# Patient Record
Sex: MALE | Race: WHITE | NOT HISPANIC OR LATINO | Employment: OTHER | URBAN - METROPOLITAN AREA
[De-identification: names, ages, dates, MRNs, and addresses within clinical notes are randomized per-mention and may not be internally consistent; named-entity substitution may affect disease eponyms.]

---

## 2023-04-10 DIAGNOSIS — I82.5Y9 CHRONIC DEEP VEIN THROMBOSIS (DVT) OF PROXIMAL VEIN OF LOWER EXTREMITY, UNSPECIFIED LATERALITY (MULTI): Primary | ICD-10-CM

## 2023-04-10 PROBLEM — I89.0 LYMPHEDEMA OF BOTH LOWER EXTREMITIES: Status: ACTIVE | Noted: 2023-04-10

## 2023-04-10 PROBLEM — M79.671 CHRONIC PAIN IN RIGHT FOOT: Status: ACTIVE | Noted: 2023-04-10

## 2023-04-10 PROBLEM — M77.31 HEEL SPUR, RIGHT: Status: ACTIVE | Noted: 2023-04-10

## 2023-04-10 PROBLEM — M62.830 SPASM OF MUSCLE, BACK: Status: ACTIVE | Noted: 2023-04-10

## 2023-04-10 PROBLEM — B35.1 ONYCHOMYCOSIS: Status: ACTIVE | Noted: 2023-04-10

## 2023-04-10 PROBLEM — G47.30 OBSERVED SLEEP APNEA: Status: ACTIVE | Noted: 2023-04-10

## 2023-04-10 PROBLEM — E66.01 OBESITY, MORBID, BMI 40.0-49.9 (MULTI): Status: ACTIVE | Noted: 2023-04-10

## 2023-04-10 PROBLEM — M25.561 RIGHT KNEE PAIN: Status: ACTIVE | Noted: 2023-04-10

## 2023-04-10 PROBLEM — R26.89 ANTALGIC GAIT: Status: ACTIVE | Noted: 2023-04-10

## 2023-04-10 PROBLEM — M20.5X1 MALLET TOE OF RIGHT FOOT: Status: ACTIVE | Noted: 2023-04-10

## 2023-04-10 PROBLEM — G25.81 RESTLESS LEG SYNDROME: Status: ACTIVE | Noted: 2023-04-10

## 2023-04-10 PROBLEM — L60.0 ONYCHOCRYPTOSIS: Status: ACTIVE | Noted: 2023-04-10

## 2023-04-10 PROBLEM — N40.1 BPH WITH OBSTRUCTION/LOWER URINARY TRACT SYMPTOMS: Status: ACTIVE | Noted: 2023-04-10

## 2023-04-10 PROBLEM — N13.8 BPH WITH OBSTRUCTION/LOWER URINARY TRACT SYMPTOMS: Status: ACTIVE | Noted: 2023-04-10

## 2023-04-10 PROBLEM — I89.0 LYMPHEDEMA: Status: ACTIVE | Noted: 2023-04-10

## 2023-04-10 PROBLEM — F41.9 ANXIETY: Status: ACTIVE | Noted: 2023-04-10

## 2023-04-10 PROBLEM — I87.2 VENOUS STASIS DERMATITIS OF LOWER EXTREMITY: Status: ACTIVE | Noted: 2023-04-10

## 2023-04-10 PROBLEM — G89.29 CHRONIC PAIN IN RIGHT FOOT: Status: ACTIVE | Noted: 2023-04-10

## 2023-04-10 PROBLEM — U07.1 DISEASE DUE TO SEVERE ACUTE RESPIRATORY SYNDROME CORONAVIRUS 2 (SARS-COV-2): Status: ACTIVE | Noted: 2023-04-10

## 2023-04-10 PROBLEM — I87.2 VENOUS INSUFFICIENCY: Status: ACTIVE | Noted: 2023-04-10

## 2023-04-10 PROBLEM — I15.9 BENIGN SECONDARY HYPERTENSION: Status: ACTIVE | Noted: 2023-04-10

## 2023-04-10 PROBLEM — I83.93 VARICOSE VEINS OF LEGS: Status: ACTIVE | Noted: 2023-04-10

## 2023-04-10 PROBLEM — G20.A1 PARKINSON'S DISEASE (MULTI): Status: ACTIVE | Noted: 2023-04-10

## 2023-04-10 PROBLEM — L60.3 DYSTROPHIC NAIL: Status: ACTIVE | Noted: 2023-04-10

## 2023-04-10 PROBLEM — I82.509 CHRONIC DEEP VEIN THROMBOSIS (DVT) (MULTI): Status: ACTIVE | Noted: 2023-04-10

## 2023-04-10 PROBLEM — G47.00 INSOMNIA: Status: ACTIVE | Noted: 2023-04-10

## 2023-04-10 PROBLEM — R06.83 SNORING: Status: ACTIVE | Noted: 2023-04-10

## 2023-04-10 PROBLEM — R47.9 SPEECH ABNORMALITY: Status: ACTIVE | Noted: 2023-04-10

## 2023-04-10 PROBLEM — G47.9 SLEEP DIFFICULTIES: Status: ACTIVE | Noted: 2023-04-10

## 2023-04-10 PROBLEM — R41.3 MEMORY LOSS: Status: ACTIVE | Noted: 2023-04-10

## 2023-04-10 PROBLEM — R60.9 EDEMA: Status: ACTIVE | Noted: 2023-04-10

## 2023-04-10 PROBLEM — R60.0 LOWER EXTREMITY EDEMA: Status: ACTIVE | Noted: 2023-04-10

## 2023-04-10 PROBLEM — R26.81 UNSTEADY GAIT: Status: ACTIVE | Noted: 2023-04-10

## 2023-04-10 PROBLEM — R97.20 ELEVATED PSA: Status: ACTIVE | Noted: 2023-04-10

## 2023-04-10 PROBLEM — I51.7 LVH (LEFT VENTRICULAR HYPERTROPHY): Status: ACTIVE | Noted: 2023-04-10

## 2023-04-10 PROBLEM — R06.02 SHORT OF BREATH ON EXERTION: Status: ACTIVE | Noted: 2023-04-10

## 2023-04-10 RX ORDER — ROPINIROLE 3 MG/1
TABLET, FILM COATED ORAL
COMMUNITY
Start: 2022-10-24 | End: 2023-07-06 | Stop reason: SDUPTHER

## 2023-04-10 RX ORDER — OMEGA-3S/DHA/EPA/FISH OIL 1000-1400
CAPSULE,DELAYED RELEASE (ENTERIC COATED) ORAL
COMMUNITY

## 2023-04-10 RX ORDER — TRIAMCINOLONE ACETONIDE 1 MG/G
CREAM TOPICAL
COMMUNITY
Start: 2021-12-21

## 2023-04-10 RX ORDER — CITALOPRAM 20 MG/1
1 TABLET, FILM COATED ORAL DAILY
COMMUNITY
Start: 2017-06-15

## 2023-04-10 RX ORDER — AMOXICILLIN 500 MG/1
CAPSULE ORAL
COMMUNITY
Start: 2022-11-18

## 2023-04-10 RX ORDER — AMLODIPINE BESYLATE 10 MG/1
TABLET ORAL
COMMUNITY
Start: 2022-10-24

## 2023-04-10 RX ORDER — LISINOPRIL 5 MG/1
1 TABLET ORAL DAILY
COMMUNITY
Start: 2023-03-24

## 2023-04-10 RX ORDER — AMLODIPINE BESYLATE 2.5 MG/1
2.5 TABLET ORAL DAILY
COMMUNITY

## 2023-04-10 RX ORDER — ACETAMINOPHEN 325 MG/1
TABLET ORAL
COMMUNITY

## 2023-04-10 RX ORDER — CHOLECALCIFEROL (VITAMIN D3) 25 MCG
TABLET ORAL
COMMUNITY

## 2023-04-10 RX ORDER — RIVAROXABAN 20 MG/1
1 TABLET, FILM COATED ORAL DAILY
COMMUNITY
Start: 2021-04-19 | End: 2023-04-10 | Stop reason: SDUPTHER

## 2023-04-10 RX ORDER — ALBUTEROL SULFATE 90 UG/1
AEROSOL, METERED RESPIRATORY (INHALATION)
COMMUNITY

## 2023-04-10 RX ORDER — GABAPENTIN 300 MG/1
CAPSULE ORAL
COMMUNITY
Start: 2021-05-04

## 2023-04-10 RX ORDER — LORAZEPAM 1 MG/1
TABLET ORAL EVERY 8 HOURS PRN
COMMUNITY
Start: 2021-05-05

## 2023-04-10 RX ORDER — LANOLIN ALCOHOL/MO/W.PET/CERES
CREAM (GRAM) TOPICAL
COMMUNITY
Start: 2021-04-19

## 2023-04-10 RX ORDER — AMLODIPINE BESYLATE 5 MG/1
1 TABLET ORAL DAILY
COMMUNITY
Start: 2022-10-24

## 2023-04-10 RX ORDER — ASCORBIC ACID 500 MG
TABLET ORAL
COMMUNITY

## 2023-04-10 RX ORDER — AMOXICILLIN 500 MG/1
1 TABLET, FILM COATED ORAL EVERY 6 HOURS
COMMUNITY
Start: 2022-11-16

## 2023-04-10 RX ORDER — CARBIDOPA AND LEVODOPA 25; 100 MG/1; MG/1
TABLET ORAL
COMMUNITY
Start: 2021-04-06

## 2023-04-10 RX ORDER — FUROSEMIDE 40 MG/1
40 TABLET ORAL DAILY
COMMUNITY

## 2023-04-10 RX ORDER — CARBIDOPA AND LEVODOPA 25; 100 MG/1; MG/1
TABLET, EXTENDED RELEASE ORAL
COMMUNITY
Start: 2022-06-13

## 2023-04-10 RX ORDER — FLUTICASONE PROPIONATE 50 MCG
SPRAY, SUSPENSION (ML) NASAL
COMMUNITY

## 2023-04-10 RX ORDER — BISACODYL 10 MG/1
SUPPOSITORY RECTAL
COMMUNITY

## 2023-04-10 RX ORDER — DIVALPROEX SODIUM 125 MG/1
TABLET, DELAYED RELEASE ORAL
COMMUNITY
Start: 2021-05-05

## 2023-04-10 RX ORDER — ADHESIVE BANDAGE
BANDAGE TOPICAL
COMMUNITY

## 2023-04-10 RX ORDER — ROPINIROLE HYDROCHLORIDE 6 MG/1
1 TABLET, FILM COATED, EXTENDED RELEASE ORAL DAILY
COMMUNITY
Start: 2022-04-11

## 2023-04-10 RX ORDER — DOCUSATE SODIUM 100 MG/1
1 CAPSULE, LIQUID FILLED ORAL 2 TIMES DAILY PRN
COMMUNITY
Start: 2021-04-19

## 2023-04-10 RX ORDER — FUROSEMIDE 20 MG/1
TABLET ORAL
COMMUNITY
Start: 2023-04-08

## 2023-04-17 DIAGNOSIS — I82.5Y9 CHRONIC DEEP VEIN THROMBOSIS (DVT) OF PROXIMAL VEIN OF LOWER EXTREMITY, UNSPECIFIED LATERALITY (MULTI): ICD-10-CM

## 2023-05-25 ENCOUNTER — NURSING HOME VISIT (OUTPATIENT)
Dept: POST ACUTE CARE | Facility: EXTERNAL LOCATION | Age: 68
End: 2023-05-25
Payer: COMMERCIAL

## 2023-05-25 VITALS
BODY MASS INDEX: 38.6 KG/M2 | RESPIRATION RATE: 16 BRPM | WEIGHT: 285 LBS | DIASTOLIC BLOOD PRESSURE: 89 MMHG | SYSTOLIC BLOOD PRESSURE: 135 MMHG | HEIGHT: 72 IN | TEMPERATURE: 97.6 F | HEART RATE: 76 BPM | OXYGEN SATURATION: 96 %

## 2023-05-25 DIAGNOSIS — I15.9 SECONDARY HYPERTENSION: ICD-10-CM

## 2023-05-25 DIAGNOSIS — I89.0 LYMPHEDEMA OF BOTH LOWER EXTREMITIES: Primary | ICD-10-CM

## 2023-05-25 DIAGNOSIS — F33.40 RECURRENT MAJOR DEPRESSIVE DISORDER, IN REMISSION (CMS-HCC): ICD-10-CM

## 2023-05-25 DIAGNOSIS — G25.81 RESTLESS LEG SYNDROME: ICD-10-CM

## 2023-05-25 DIAGNOSIS — G20.A1 PARKINSON'S DISEASE (MULTI): ICD-10-CM

## 2023-05-25 PROCEDURE — 99310 SBSQ NF CARE HIGH MDM 45: CPT | Performed by: PHYSICIAN ASSISTANT

## 2023-05-25 NOTE — LETTER
"Patient: Stephen Varner  : 1955    Encounter Date: 2023  Name: Stephen Varner  YOB: 1955    Chief complaint: Chronic lymphedema of lower extremities.    HPI: This is a 67 year old  male who has a medical history remarkable for Parkinson's disease, anxiety disorder, hypertension, BPH, restless leg syndrome, MDD, and lymphedema. Patient is a long term resident at [facility]. Patient has lower leg edema and uses compression pump device daily to treat. Recent echocardiogram on 3/1/2023 shows ejection fraction of 55 -60% with impaired relaxation pattern of left ventricular diastolic filling. He is receiving Lisinopril and Lasix. Patient is able to able with walker. He describes his breathing as \" good \".     Edema    Presents with chronic edema. The current episode started more than 1 year ago. These episodes happen throughout the day. The problem has been stable. The edema is present on the both side(s). Risk factors for edema include no known risk factors.     Pertinent negative symptoms include no chest pain, no cough, no decreased urine volume, no fever, no orthopnea and no palpitations.     Past medical history is significant for CHF. Treatments tried include elevating limb(s) (compression pump). There has been moderate improvement on treatment(s).     Review of systems:   ROS negative except were noted in HPI.    Code Status: full code    /89   Pulse 76   Temp 36.4 °C (97.6 °F)   Resp 16   Ht 1.829 m (6')   Wt 129 kg (285 lb)   SpO2 96%   BMI 38.65 kg/m²      Physical Exam  Constitutional:       General: He is not in acute distress.     Appearance: He is obese. He is not toxic-appearing.   HENT:      Head: Normocephalic.      Nose: Nose normal.      Mouth/Throat:      Mouth: Mucous membranes are moist.      Pharynx: Oropharynx is clear.   Eyes:      Extraocular Movements: Extraocular movements intact.      Pupils: Pupils are equal, round, and " reactive to light.   Cardiovascular:      Rate and Rhythm: Normal rate and regular rhythm.      Pulses: Normal pulses.   Pulmonary:      Effort: Pulmonary effort is normal.      Breath sounds: Normal breath sounds. No wheezing or rales.   Abdominal:      General: Bowel sounds are normal. There is no distension.      Palpations: Abdomen is soft.      Tenderness: There is no abdominal tenderness. There is no guarding.   Genitourinary:     Comments: Voiding.  Musculoskeletal:      Cervical back: Normal range of motion.      Right lower leg: Edema present.      Left lower leg: Edema present.   Lymphadenopathy:      Cervical: No cervical adenopathy.   Skin:     General: Skin is warm and dry.      Capillary Refill: Capillary refill takes less than 2 seconds.   Neurological:      General: No focal deficit present.      Mental Status: He is alert and oriented to person, place, and time.   Psychiatric:         Mood and Affect: Mood normal.         Behavior: Behavior normal.        Medications reviewed during visit at facility.  Bisacodyl 10 mg suppository daily prn  Colace 100 mg po daily  Citalopram 20 mg po daily  MOM 30 ml po daily prn constipation  Tylenol 650 mg po daily q 4 hours prn  Carbidopa/levodopa 25/100 mg po two capsule daily  Carbidopa/Levodopa 25/100 mg po tid  Loperamide 2 mg po daily  Zofran 4 mg po q 8 hours prn  Voltaren gel 1% to both legs  Ropinirole 6 mg po daily  Triamcinolone Acetonide Cream 0.1 to affected areas q 6 hours prn  Lisinopril 5 mg po daily  Amlodipine 5 mg po daily  Lasix 40 mg po bid    Labs reviewed at facility:  oratory: 5/4/2023 15:07 BASIC MET PNL INCL GFR (BMP) / CBC W/DIFF  Latest Version (more available)  Reviewed by shani on 5/5/2023 12:53  Resident Information  Resident: Stephen Varner ([id number])  Admit Date: 8/12/2021  Admitting Provider:   Attending Provider:   Copy to List:   Report Information  Collection Date: 5/4/2023 05:20  Received Date: 5/4/2023 05:44  Reported  Date: 2023 15:07  Ord. Provider: Ankita Martinez  Source Calvo: [id number]  Lab Information  Status: Completed  Flag:    Reporting Lab:   [facility]  Order #:   [id number]  Vendor Order #:   [id number]  Category:   Chemistry, Hematology  Order Notes  Result for:  AUBRIE OLGUIN  ( 1955, M)         Results   Show All Details Result Units Ref. Range Flag Status   BASIC MET PNL INCL GFR (BMP)       GLUCOSE  85    Final      GLUCOSE, FASTING         65-99  mg/dL                               GLUCOSE, NON-FASTING      mg/dL       SODIUM  139 mEq/L 136-145  Final           POTASSIUM  3.9 mEq/L 3.5-5.3  Final           CHLORIDE  104 mEq/L   Final           CARBON DIOXIDE (CO2)  23 mEq/L 21-33  Final           BUN (UREA NITROGEN)  23 mg/dL 7-25  Final           CREATININE  1.1 mg/dL 0.6-1.2  Final           BUN/CREATININE RATIO  21  6-22  Final           GFR-  81 mL/min/1.73 m2 >60  Final           GFR-NON-AFRICAN AMERICAN  67 mL/min/1.73 m2 >60  Final           Stage of CKD     eGFR (mL/min/1.73 square meters)          Stage 1        >/= 90        or > 90          Stage 2        60 - 89          Stage 3        30 - 59          Stage 4        15 - 29          Stage 5        </= 14        or < 15  ** GFR is reliable for adults 17 to 69 years with stable kidney      function.        CALCIUM  9.4 mg/dL 8.4-10.2  Final       CBC W/DIFF       WBC  6.6 K/cmm 4.5-10.8  Final           RBC  4.64 M/cmm 4.00-6.60  Final           HEMOGLOBIN  14.2 g/dL 14.0-18.0  Final           HEMATOCRIT  43.1 % 42.0-54.0  Final           MCV  92.9 fL 80.0-100.0  Final           MCH  30.6 pg 26.0-35.0  Final           MCHC  32.9 g/dL 31.0-36.5  Final           RDW  13.3 % 11.0-16.0  Final           PLATELET  218 K/cmm 150-450  Final           MPV  8.5 fL 6.5-12.0  Final           NEUTROPHILS  50.5 % 40.0-80.0  Final           LYMPHS  41.3 % 13.0-48.0  Final           MONOCYTES  6.2 % 2.0-12.0  Final            EOS  1.6 % 0.0-8.0  Final           BASO  0.4 % 0.0-2.0  Final           NEUTS (ABSOLUTE)  3.40 K/uL 1.50-7.60  Final           LYMPHS (ABSOLUTE)  2.70 K/uL 0.90-5.50  Final           MONOCYTES (ABSOLUTE)  0.40 K/uL 0.15-1.10  Final           EOS (ABSOLUTE)  0.10 K/uL 0.20-0.80 L Final           NUCLEATED RBC  0.2 NRBC/100 WBC <1.0  Final          Assessment/Plan   Problem List Items Addressed This Visit       Secondary hypertension     Amlodipine 5 mg po daily. Lisinopril 5 mg po daily         Lymphedema of both lower extremities - Primary     Compression pump to both lower extremities once a day. Lasix 40 mg po bid. Review lab work.         Parkinson's disease (CMS/[facility])     Carbidopa/levodopa 25/100 mg po two capsule daily  Carbidopa/Levodopa 25/100 mg po tid         Restless leg syndrome     Ropinirole 6 mg po daily         Recurrent major depressive disorder, in remission (CMS/[facility])     Calm and cooperative today. Continue with Citalopram 20 mg po daily            Time:  I spent 45 minutes or greater with the patient. Greater than 50% of this time was spent in counseling and or coordination of care. The time includes prep time of reviewing vital signs, report from direct nursing staff and or therapists, hospital documentation, reviewing labs, radiographs, diagnostic tests and or consultations, time directly spent with the patient interviewing, examining, and education regarding diagnosis, treatments, and medications, as well as documentation in the electronic medical record, and reviewing the plan of care and any new orders with the patient, nursing staff and other staff directly related to the patients care.      Noah Hall PA-C       Electronically Signed By: Noah Hall PA-C   5/28/23  3:31 PM

## 2023-05-26 NOTE — PROGRESS NOTES
"5/25/2023  Name: Stephen Varner  YOB: 1955    Chief complaint: Chronic lymphedema of lower extremities.    HPI: This is a 67 year old  male who has a medical history remarkable for Parkinson's disease, anxiety disorder, hypertension, BPH, restless leg syndrome, MDD, and lymphedema. Patient is a long term resident at [facility]. Patient has lower leg edema and uses compression pump device daily to treat. Recent echocardiogram on 3/1/2023 shows ejection fraction of 55 -60% with impaired relaxation pattern of left ventricular diastolic filling. He is receiving Lisinopril and Lasix. Patient is able to able with walker. He describes his breathing as \" good \".     Edema    Presents with chronic edema. The current episode started more than 1 year ago. These episodes happen throughout the day. The problem has been stable. The edema is present on the both side(s). Risk factors for edema include no known risk factors.     Pertinent negative symptoms include no chest pain, no cough, no decreased urine volume, no fever, no orthopnea and no palpitations.     Past medical history is significant for CHF. Treatments tried include elevating limb(s) (compression pump). There has been moderate improvement on treatment(s).     Review of systems:   ROS negative except were noted in HPI.    Code Status: full code    /89   Pulse 76   Temp 36.4 °C (97.6 °F)   Resp 16   Ht 1.829 m (6')   Wt 129 kg (285 lb)   SpO2 96%   BMI 38.65 kg/m²      Physical Exam  Constitutional:       General: He is not in acute distress.     Appearance: He is obese. He is not toxic-appearing.   HENT:      Head: Normocephalic.      Nose: Nose normal.      Mouth/Throat:      Mouth: Mucous membranes are moist.      Pharynx: Oropharynx is clear.   Eyes:      Extraocular Movements: Extraocular movements intact.      Pupils: Pupils are equal, round, and reactive to light.   Cardiovascular:      Rate and Rhythm: Normal rate and " regular rhythm.      Pulses: Normal pulses.   Pulmonary:      Effort: Pulmonary effort is normal.      Breath sounds: Normal breath sounds. No wheezing or rales.   Abdominal:      General: Bowel sounds are normal. There is no distension.      Palpations: Abdomen is soft.      Tenderness: There is no abdominal tenderness. There is no guarding.   Genitourinary:     Comments: Voiding.  Musculoskeletal:      Cervical back: Normal range of motion.      Right lower leg: Edema present.      Left lower leg: Edema present.   Lymphadenopathy:      Cervical: No cervical adenopathy.   Skin:     General: Skin is warm and dry.      Capillary Refill: Capillary refill takes less than 2 seconds.   Neurological:      General: No focal deficit present.      Mental Status: He is alert and oriented to person, place, and time.   Psychiatric:         Mood and Affect: Mood normal.         Behavior: Behavior normal.        Medications reviewed during visit at facility.  Bisacodyl 10 mg suppository daily prn  Colace 100 mg po daily  Citalopram 20 mg po daily  MOM 30 ml po daily prn constipation  Tylenol 650 mg po daily q 4 hours prn  Carbidopa/levodopa 25/100 mg po two capsule daily  Carbidopa/Levodopa 25/100 mg po tid  Loperamide 2 mg po daily  Zofran 4 mg po q 8 hours prn  Voltaren gel 1% to both legs  Ropinirole 6 mg po daily  Triamcinolone Acetonide Cream 0.1 to affected areas q 6 hours prn  Lisinopril 5 mg po daily  Amlodipine 5 mg po daily  Lasix 40 mg po bid    Labs reviewed at facility:  oratory: 5/4/2023 15:07 BASIC MET PNL INCL GFR (BMP) / CBC W/DIFF  Latest Version (more available)  Reviewed by shani on 5/5/2023 12:53  Resident Information  Resident: Stephen Varner ([id number] NF)  Admit Date: 8/12/2021  Admitting Provider:   Attending Provider:   Copy to List:   Report Information  Collection Date: 5/4/2023 05:20  Received Date: 5/4/2023 05:44  Reported Date: 5/4/2023 15:07  Ord. Provider: Ankita Martinez  Calvo: [id number]  Lab Information  Status: Completed  Flag:    Reporting Lab:   [facility]  Order #:   [id number]  Vendor Order #:   [id number]  Category:   Chemistry, Hematology  Order Notes  Result for:  AUBRIE OLGUIN  ( 1955, M)         Results   Show All Details Result Units Ref. Range Flag Status   BASIC MET PNL INCL GFR (BMP)       GLUCOSE  85    Final      GLUCOSE, FASTING         65-99  mg/dL                               GLUCOSE, NON-FASTING      mg/dL       SODIUM  139 mEq/L 136-145  Final           POTASSIUM  3.9 mEq/L 3.5-5.3  Final           CHLORIDE  104 mEq/L   Final           CARBON DIOXIDE (CO2)  23 mEq/L 21-33  Final           BUN (UREA NITROGEN)  23 mg/dL 7-25  Final           CREATININE  1.1 mg/dL 0.6-1.2  Final           BUN/CREATININE RATIO  21  6-22  Final           GFR-  81 mL/min/1.73 m2 >60  Final           GFR-NON-AFRICAN AMERICAN  67 mL/min/1.73 m2 >60  Final           Stage of CKD     eGFR (mL/min/1.73 square meters)          Stage 1        >/= 90        or > 90          Stage 2        60 - 89          Stage 3        30 - 59          Stage 4        15 - 29          Stage 5        </= 14        or < 15  ** GFR is reliable for adults 17 to 69 years with stable kidney      function.        CALCIUM  9.4 mg/dL 8.4-10.2  Final       CBC W/DIFF       WBC  6.6 K/cmm 4.5-10.8  Final           RBC  4.64 M/cmm 4.00-6.60  Final           HEMOGLOBIN  14.2 g/dL 14.0-18.0  Final           HEMATOCRIT  43.1 % 42.0-54.0  Final           MCV  92.9 fL 80.0-100.0  Final           MCH  30.6 pg 26.0-35.0  Final           MCHC  32.9 g/dL 31.0-36.5  Final           RDW  13.3 % 11.0-16.0  Final           PLATELET  218 K/cmm 150-450  Final           MPV  8.5 fL 6.5-12.0  Final           NEUTROPHILS  50.5 % 40.0-80.0  Final           LYMPHS  41.3 % 13.0-48.0  Final           MONOCYTES  6.2 % 2.0-12.0  Final           EOS  1.6 % 0.0-8.0  Final            BASO  0.4 % 0.0-2.0  Final           NEUTS (ABSOLUTE)  3.40 K/uL 1.50-7.60  Final           LYMPHS (ABSOLUTE)  2.70 K/uL 0.90-5.50  Final           MONOCYTES (ABSOLUTE)  0.40 K/uL 0.15-1.10  Final           EOS (ABSOLUTE)  0.10 K/uL 0.20-0.80 L Final           NUCLEATED RBC  0.2 NRBC/100 WBC <1.0  Final          Assessment/Plan    Problem List Items Addressed This Visit       Secondary hypertension     Amlodipine 5 mg po daily. Lisinopril 5 mg po daily         Lymphedema of both lower extremities - Primary     Compression pump to both lower extremities once a day. Lasix 40 mg po bid. Review lab work.         Parkinson's disease (CMS/[facility])     Carbidopa/levodopa 25/100 mg po two capsule daily  Carbidopa/Levodopa 25/100 mg po tid         Restless leg syndrome     Ropinirole 6 mg po daily         Recurrent major depressive disorder, in remission (CMS/[facility])     Calm and cooperative today. Continue with Citalopram 20 mg po daily            Time:  I spent 45 minutes or greater with the patient. Greater than 50% of this time was spent in counseling and or coordination of care. The time includes prep time of reviewing vital signs, report from direct nursing staff and or therapists, hospital documentation, reviewing labs, radiographs, diagnostic tests and or consultations, time directly spent with the patient interviewing, examining, and education regarding diagnosis, treatments, and medications, as well as documentation in the electronic medical record, and reviewing the plan of care and any new orders with the patient, nursing staff and other staff directly related to the patients care.      Noah Hall PA-C

## 2023-05-28 PROBLEM — F33.40 RECURRENT MAJOR DEPRESSIVE DISORDER, IN REMISSION (CMS-HCC): Status: ACTIVE | Noted: 2023-05-28

## 2023-05-28 ASSESSMENT — ENCOUNTER SYMPTOMS
PALPITATIONS: 0
ORTHOPNEA: 0
COUGH: 0
FEVER: 0

## 2023-06-22 ENCOUNTER — NURSING HOME VISIT (OUTPATIENT)
Dept: POST ACUTE CARE | Facility: EXTERNAL LOCATION | Age: 68
End: 2023-06-22
Payer: COMMERCIAL

## 2023-06-22 VITALS
OXYGEN SATURATION: 95 % | SYSTOLIC BLOOD PRESSURE: 108 MMHG | TEMPERATURE: 98.5 F | WEIGHT: 295 LBS | HEIGHT: 72 IN | DIASTOLIC BLOOD PRESSURE: 75 MMHG | RESPIRATION RATE: 18 BRPM | HEART RATE: 68 BPM | BODY MASS INDEX: 39.96 KG/M2

## 2023-06-22 DIAGNOSIS — G20.A1 PARKINSON'S DISEASE (MULTI): Primary | ICD-10-CM

## 2023-06-22 DIAGNOSIS — G25.81 RESTLESS LEG SYNDROME: ICD-10-CM

## 2023-06-22 DIAGNOSIS — I89.0 LYMPHEDEMA OF BOTH LOWER EXTREMITIES: ICD-10-CM

## 2023-06-22 DIAGNOSIS — R26.89 ANTALGIC GAIT: ICD-10-CM

## 2023-06-22 PROCEDURE — 99309 SBSQ NF CARE MODERATE MDM 30: CPT | Performed by: PHYSICIAN ASSISTANT

## 2023-06-22 NOTE — PROGRESS NOTES
"6/22/2023  Name: Stephen Varner  YOB: 1955    Chief complaint: Follow up for impaired mobility.    HPI: Patient seen and examined in his room.  He has a medical history significant for Parkinson;s disease and chronic lymphedema which impacts on his ability to ambulate. Patient is able to safely walk outside and throughout the building with support from a U step walker. He is using compression pump to BLE three times a day to improve chronic lower leg edema. Patient describes his breathing as \" good \". Offers no new complaints.    Extremity Weakness  This is a chronic problem. The current episode started more than 1 year ago. The problem occurs daily. The problem has been waxing and waning. Pertinent negatives include no anorexia, arthralgias, change in bowel habit, chest pain, chills, fever, myalgias or urinary symptoms. Nothing aggravates the symptoms. He has tried walking (lymphedema compression pump.) for the symptoms. The treatment provided moderate relief.       Review of systems:   ROS negative except were noted in HPI.    Code Status: full code    /75   Pulse 68   Temp 36.9 °C (98.5 °F)   Resp 18   Ht 1.829 m (6')   Wt 134 kg (295 lb)   SpO2 95%   BMI 40.01 kg/m²      Physical Exam  Constitutional:       General: He is not in acute distress.     Appearance: He is obese. He is not toxic-appearing.   HENT:      Head: Normocephalic.      Nose: Nose normal.      Mouth/Throat:      Mouth: Mucous membranes are moist.      Pharynx: Oropharynx is clear.   Eyes:      Extraocular Movements: Extraocular movements intact.      Pupils: Pupils are equal, round, and reactive to light.   Cardiovascular:      Rate and Rhythm: Normal rate and regular rhythm.      Pulses: Normal pulses.   Pulmonary:      Effort: Pulmonary effort is normal.      Breath sounds: Normal breath sounds. No wheezing or rales.   Abdominal:      General: Bowel sounds are normal. There is no distension.      Palpations: " Abdomen is soft.      Tenderness: There is no abdominal tenderness. There is no guarding.   Genitourinary:     Comments: Voiding.  Musculoskeletal:      Cervical back: Normal range of motion.      Right lower leg: Edema present.      Left lower leg: Edema present.   Lymphadenopathy:      Cervical: No cervical adenopathy.   Skin:     General: Skin is warm and dry.      Capillary Refill: Capillary refill takes less than 2 seconds.   Neurological:      General: No focal deficit present.      Mental Status: He is alert and oriented to person, place, and time.   Psychiatric:         Mood and Affect: Mood normal.         Behavior: Behavior normal.     Medications reviewed during visit at facility.  Bisacodyl 10 mg suppository daily prn  Colace 100 mg po daily  Citalopram 20 mg po daily  MOM 30 ml po daily prn constipation  Tylenol 650 mg po daily q 4 hours prn  Carbidopa/levodopa 25/100 mg po two capsule daily  Carbidopa/Levodopa 25/100 mg po tid  Loperamide 2 mg po daily  Zofran 4 mg po q 8 hours prn  Voltaren gel 1% to both legs  Ropinirole 6 mg po daily  Triamcinolone Acetonide Cream 0.1 to affected areas q 6 hours prn  Lisinopril 5 mg po daily  Amlodipine 5 mg po daily  Lasix 40 mg po bid  Labs reviewed at facility:    Assessment/Plan    Problem List Items Addressed This Visit       Antalgic gait     Ambulatory using U Step walker. Uses gym here at  [facility]. Has routine of daily walk outside on property grounds.         Lymphedema of both lower extremities     Compression pump to both lower extremities once a day. Lasix 40 mg po bid. Review lab work.          Parkinson's disease (CMS/[facility]) - Primary     Carbidopa/levodopa 25/100 mg po two capsule daily. Carbidopa/Levodopa 25/100 mg po tid  Has seen Dr. Farzaneh Morrow of neurology. Last visit 10/26/2022.          Restless leg syndrome     Ropinirole 6 mg po daily             Time:    Noah Hall PA-C

## 2023-06-22 NOTE — LETTER
"Patient: Stephen Varner  : 1955    Encounter Date: 2023  Name: Stephen Varner  YOB: 1955    Chief complaint: Follow up for impaired mobility.    HPI: Patient seen and examined in his room.  He has a medical history significant for Parkinson;s disease and chronic lymphedema which impacts on his ability to ambulate. Patient is able to safely walk outside and throughout the building with support from a U step walker. He is using compression pump to BLE three times a day to improve chronic lower leg edema. Patient describes his breathing as \" good \". Offers no new complaints.    Extremity Weakness  This is a chronic problem. The current episode started more than 1 year ago. The problem occurs daily. The problem has been waxing and waning. Pertinent negatives include no anorexia, arthralgias, change in bowel habit, chest pain, chills, fever, myalgias or urinary symptoms. Nothing aggravates the symptoms. He has tried walking (lymphedema compression pump.) for the symptoms. The treatment provided moderate relief.       Review of systems:   ROS negative except were noted in HPI.    Code Status: full code    /75   Pulse 68   Temp 36.9 °C (98.5 °F)   Resp 18   Ht 1.829 m (6')   Wt 134 kg (295 lb)   SpO2 95%   BMI 40.01 kg/m²      Physical Exam  Constitutional:       General: He is not in acute distress.     Appearance: He is obese. He is not toxic-appearing.   HENT:      Head: Normocephalic.      Nose: Nose normal.      Mouth/Throat:      Mouth: Mucous membranes are moist.      Pharynx: Oropharynx is clear.   Eyes:      Extraocular Movements: Extraocular movements intact.      Pupils: Pupils are equal, round, and reactive to light.   Cardiovascular:      Rate and Rhythm: Normal rate and regular rhythm.      Pulses: Normal pulses.   Pulmonary:      Effort: Pulmonary effort is normal.      Breath sounds: Normal breath sounds. No wheezing or rales.   Abdominal:      " General: Bowel sounds are normal. There is no distension.      Palpations: Abdomen is soft.      Tenderness: There is no abdominal tenderness. There is no guarding.   Genitourinary:     Comments: Voiding.  Musculoskeletal:      Cervical back: Normal range of motion.      Right lower leg: Edema present.      Left lower leg: Edema present.   Lymphadenopathy:      Cervical: No cervical adenopathy.   Skin:     General: Skin is warm and dry.      Capillary Refill: Capillary refill takes less than 2 seconds.   Neurological:      General: No focal deficit present.      Mental Status: He is alert and oriented to person, place, and time.   Psychiatric:         Mood and Affect: Mood normal.         Behavior: Behavior normal.     Medications reviewed during visit at facility.  Bisacodyl 10 mg suppository daily prn  Colace 100 mg po daily  Citalopram 20 mg po daily  MOM 30 ml po daily prn constipation  Tylenol 650 mg po daily q 4 hours prn  Carbidopa/levodopa 25/100 mg po two capsule daily  Carbidopa/Levodopa 25/100 mg po tid  Loperamide 2 mg po daily  Zofran 4 mg po q 8 hours prn  Voltaren gel 1% to both legs  Ropinirole 6 mg po daily  Triamcinolone Acetonide Cream 0.1 to affected areas q 6 hours prn  Lisinopril 5 mg po daily  Amlodipine 5 mg po daily  Lasix 40 mg po bid  Labs reviewed at facility:    Assessment/Plan   Problem List Items Addressed This Visit       Antalgic gait     Ambulatory using U Step walker. Uses gym here at  [facility]. Has routine of daily walk outside on property grounds.         Lymphedema of both lower extremities     Compression pump to both lower extremities once a day. Lasix 40 mg po bid. Review lab work.          Parkinson's disease (CMS/[facility]) - Primary     Carbidopa/levodopa 25/100 mg po two capsule daily. Carbidopa/Levodopa 25/100 mg po tid  Has seen Dr. aFrzaneh Morrow of neurology. Last visit 10/26/2022.          Restless leg syndrome     Ropinirole 6 mg po daily             Time:    Noah  JASWANT Hall PA-C       Electronically Signed By: Noah Hall PA-C   6/24/23  5:27 PM

## 2023-06-24 ASSESSMENT — ENCOUNTER SYMPTOMS
EXTREMITY WEAKNESS: 1
MYALGIAS: 0
CHILLS: 0
CHANGE IN BOWEL HABIT: 0
FEVER: 0
ARTHRALGIAS: 0
ANOREXIA: 0

## 2023-06-24 NOTE — ASSESSMENT & PLAN NOTE
Carbidopa/levodopa 25/100 mg po two capsule daily. Carbidopa/Levodopa 25/100 mg po tid  Has seen Dr. Farzaneh Morrow of neurology. Last visit 10/26/2022.

## 2023-06-24 NOTE — ASSESSMENT & PLAN NOTE
Ambulatory using U Step walker. Uses gym here at  [facility]. Has routine of daily walk outside on property grounds.

## 2023-07-06 DIAGNOSIS — G20.A1 PARKINSON DISEASE (MULTI): Primary | ICD-10-CM

## 2023-07-12 RX ORDER — ROPINIROLE 3 MG/1
3 TABLET, FILM COATED ORAL
Qty: 30 TABLET | Refills: 0 | Status: SHIPPED | OUTPATIENT
Start: 2023-07-12 | End: 2023-08-11

## 2023-07-18 ENCOUNTER — NURSING HOME VISIT (OUTPATIENT)
Dept: POST ACUTE CARE | Facility: EXTERNAL LOCATION | Age: 68
End: 2023-07-18
Payer: COMMERCIAL

## 2023-07-18 VITALS
SYSTOLIC BLOOD PRESSURE: 146 MMHG | TEMPERATURE: 97.8 F | DIASTOLIC BLOOD PRESSURE: 92 MMHG | RESPIRATION RATE: 18 BRPM | HEIGHT: 67 IN | HEART RATE: 67 BPM | WEIGHT: 281 LBS | OXYGEN SATURATION: 94 % | BODY MASS INDEX: 44.1 KG/M2

## 2023-07-18 DIAGNOSIS — G25.81 RESTLESS LEG SYNDROME: ICD-10-CM

## 2023-07-18 DIAGNOSIS — F33.40 RECURRENT MAJOR DEPRESSIVE DISORDER, IN REMISSION (CMS-HCC): ICD-10-CM

## 2023-07-18 DIAGNOSIS — G20.A1 PARKINSON'S DISEASE (MULTI): Primary | ICD-10-CM

## 2023-07-18 DIAGNOSIS — I89.0 LYMPHEDEMA OF BOTH LOWER EXTREMITIES: ICD-10-CM

## 2023-07-18 PROCEDURE — 99309 SBSQ NF CARE MODERATE MDM 30: CPT | Performed by: PHYSICIAN ASSISTANT

## 2023-07-18 NOTE — LETTER
"Patient: Stephen Varner  : 1955    Encounter Date: 2023  Name: Stephen Varner  YOB: 1955    Chief complaint: Follow up for Parkinson's.      HPI: Patient ambulating in hallway with walker for safety. He is requesting refill of current medication. Last visit Ropinirole 3 mg dose was added to current regimen. Patient reports he feel well and offers no new complaints. Denies fever, chills, sob, chest pain, tremors, dizziness, or falls.    Review of systems:   ROS negative except were noted in HPI.    Code Status: full code    BP (!) 146/92   Pulse 67   Temp 36.6 °C (97.8 °F)   Resp 18   Ht 1.702 m (5' 7\")   Wt 127 kg (281 lb)   SpO2 94%   BMI 44.01 kg/m²         Physical Exam  Constitutional:       General: He is not in acute distress.     Appearance: He is obese. He is not toxic-appearing.   HENT:      Head: Normocephalic.      Nose: Nose normal.      Mouth/Throat:      Mouth: Mucous membranes are moist.      Pharynx: Oropharynx is clear.   Eyes:      Extraocular Movements: Extraocular movements intact.      Pupils: Pupils are equal, round, and reactive to light.   Cardiovascular:      Rate and Rhythm: Normal rate and regular rhythm.      Pulses: Normal pulses.   Pulmonary:      Effort: Pulmonary effort is normal.      Breath sounds: Normal breath sounds. No wheezing or rales.   Abdominal:      General: Bowel sounds are normal. There is no distension.      Palpations: Abdomen is soft.      Tenderness: There is no abdominal tenderness. There is no guarding.   Genitourinary:     Comments: Voiding.  Musculoskeletal:      Cervical back: Normal range of motion.      Right lower leg: Edema present.      Left lower leg: Edema present.   Lymphadenopathy:      Cervical: No cervical adenopathy.   Skin:     General: Skin is warm and dry.      Capillary Refill: Capillary refill takes less than 2 seconds.   Neurological:      General: No focal deficit present.      Mental Status: " He is alert and oriented to person, place, and time.   Psychiatric:         Mood and Affect: Mood normal.         Behavior: Behavior norm    Physical Exam     Medications reviewed during visit at facility.  Bisacodyl 10 mg suppository daily prn  Colace 100 mg po daily  Citalopram 20 mg po daily  MOM 30 ml po daily prn constipation  Tylenol 650 mg po daily q 4 hours prn  Carbidopa/levodopa 25/100 mg po two capsule daily  Carbidopa/Levodopa 25/100 mg po tid  Loperamide 2 mg po daily  Zofran 4 mg po q 8 hours prn  Voltaren gel 1% to both legs  Ropinirole 6 mg po daily 5 PM  Ropinirole 3 mg po daily 8 AM  Triamcinolone Acetonide Cream 0.1 to affected areas q 6 hours prn  Lisinopril 5 mg po daily  Amlodipine 5 mg po daily  Lasix 40 mg po bid  Labs reviewed at facility:    Assessment/Plan   Problem List Items Addressed This Visit       Lymphedema of both lower extremities     Compression pump to both lower extremities once a day. Lasix 40 mg po bid. Order BMP CBC.             Parkinson's disease (CMS/HCC) - Primary     Reorder Carbidopa/Levodopa. Patient's wife will schedule appointment with Dr. Farzaneh Morrow ( neurology).         Restless leg syndrome     Ropinirole 3 mg po daily 8 AM. Ropinirole 6 mg po daily 5 PM.         Recurrent major depressive disorder, in remission (CMS/HCC)     Calm and cooperative today. Continue with Citalopram 20 mg po daily             Time:    Noah Hall PA-C       Electronically Signed By: Noah Hall PA-C   7/19/23  9:15 AM

## 2023-07-18 NOTE — PROGRESS NOTES
"7/18/2023  Name: Stephen Varner  YOB: 1955    Chief complaint: Follow up for Parkinson's.      HPI: Patient ambulating in hallway with walker for safety. He is requesting refill of current medication. Last visit Ropinirole 3 mg dose was added to current regimen. Patient reports he feel well and offers no new complaints. Denies fever, chills, sob, chest pain, tremors, dizziness, or falls.    Review of systems:   ROS negative except were noted in HPI.    Code Status: full code    BP (!) 146/92   Pulse 67   Temp 36.6 °C (97.8 °F)   Resp 18   Ht 1.702 m (5' 7\")   Wt 127 kg (281 lb)   SpO2 94%   BMI 44.01 kg/m²         Physical Exam  Constitutional:       General: He is not in acute distress.     Appearance: He is obese. He is not toxic-appearing.   HENT:      Head: Normocephalic.      Nose: Nose normal.      Mouth/Throat:      Mouth: Mucous membranes are moist.      Pharynx: Oropharynx is clear.   Eyes:      Extraocular Movements: Extraocular movements intact.      Pupils: Pupils are equal, round, and reactive to light.   Cardiovascular:      Rate and Rhythm: Normal rate and regular rhythm.      Pulses: Normal pulses.   Pulmonary:      Effort: Pulmonary effort is normal.      Breath sounds: Normal breath sounds. No wheezing or rales.   Abdominal:      General: Bowel sounds are normal. There is no distension.      Palpations: Abdomen is soft.      Tenderness: There is no abdominal tenderness. There is no guarding.   Genitourinary:     Comments: Voiding.  Musculoskeletal:      Cervical back: Normal range of motion.      Right lower leg: Edema present.      Left lower leg: Edema present.   Lymphadenopathy:      Cervical: No cervical adenopathy.   Skin:     General: Skin is warm and dry.      Capillary Refill: Capillary refill takes less than 2 seconds.   Neurological:      General: No focal deficit present.      Mental Status: He is alert and oriented to person, place, and time.   Psychiatric:        "  Mood and Affect: Mood normal.         Behavior: Behavior norm    Physical Exam     Medications reviewed during visit at facility.  Bisacodyl 10 mg suppository daily prn  Colace 100 mg po daily  Citalopram 20 mg po daily  MOM 30 ml po daily prn constipation  Tylenol 650 mg po daily q 4 hours prn  Carbidopa/levodopa 25/100 mg po two capsule daily  Carbidopa/Levodopa 25/100 mg po tid  Loperamide 2 mg po daily  Zofran 4 mg po q 8 hours prn  Voltaren gel 1% to both legs  Ropinirole 6 mg po daily 5 PM  Ropinirole 3 mg po daily 8 AM  Triamcinolone Acetonide Cream 0.1 to affected areas q 6 hours prn  Lisinopril 5 mg po daily  Amlodipine 5 mg po daily  Lasix 40 mg po bid  Labs reviewed at facility:    Assessment/Plan    Problem List Items Addressed This Visit       Lymphedema of both lower extremities     Compression pump to both lower extremities once a day. Lasix 40 mg po bid. Order BMP CBC.             Parkinson's disease (CMS/HCC) - Primary     Reorder Carbidopa/Levodopa. Patient's wife will schedule appointment with Dr. Farzaneh Morrow ( neurology).         Restless leg syndrome     Ropinirole 3 mg po daily 8 AM. Ropinirole 6 mg po daily 5 PM.         Recurrent major depressive disorder, in remission (CMS/HCC)     Calm and cooperative today. Continue with Citalopram 20 mg po daily             Time:    Noah Hall PA-C

## 2023-07-19 NOTE — ASSESSMENT & PLAN NOTE
Reorder Carbidopa/Levodopa. Patient's wife will schedule appointment with Dr. Farzaneh Morrow ( neurology).

## 2023-08-22 ENCOUNTER — NURSING HOME VISIT (OUTPATIENT)
Dept: POST ACUTE CARE | Facility: EXTERNAL LOCATION | Age: 68
End: 2023-08-22
Payer: COMMERCIAL

## 2023-08-22 VITALS
SYSTOLIC BLOOD PRESSURE: 119 MMHG | HEART RATE: 68 BPM | HEIGHT: 72 IN | RESPIRATION RATE: 18 BRPM | OXYGEN SATURATION: 95 % | BODY MASS INDEX: 38.74 KG/M2 | WEIGHT: 286 LBS | TEMPERATURE: 97.9 F | DIASTOLIC BLOOD PRESSURE: 75 MMHG

## 2023-08-22 DIAGNOSIS — G25.81 RESTLESS LEG SYNDROME: ICD-10-CM

## 2023-08-22 DIAGNOSIS — G20.A1 PARKINSON'S DISEASE (MULTI): Primary | ICD-10-CM

## 2023-08-22 DIAGNOSIS — R26.89 ANTALGIC GAIT: ICD-10-CM

## 2023-08-22 DIAGNOSIS — I89.0 LYMPHEDEMA OF BOTH LOWER EXTREMITIES: ICD-10-CM

## 2023-08-22 PROCEDURE — 99309 SBSQ NF CARE MODERATE MDM 30: CPT | Performed by: PHYSICIAN ASSISTANT

## 2023-08-22 NOTE — LETTER
Patient: Stephen Varner  : 1955    Encounter Date: 2023  Name: Stephen Varner  YOB: 1955    Chief complaint: Medication review. Follow up for impaired mobility.    HPI: Patient remains active despite history of Parkinson's. Patient maintains daily routine of walking around nursing home grounds each day. He would like his medications reviewed and prescriptions provided to maintain existing therapy. Denies fever, chill, dizziness, chest pain, or falls.    Extremity Weakness  The current episode started more than 1 year ago. The problem occurs daily. The problem has been unchanged. Pertinent negatives include no anorexia, arthralgias, change in bowel habit, chills, fever, joint swelling, urinary symptoms, vertigo or visual change. Nothing aggravates the symptoms. He has tried walking (Carbidopa/Levodopa. Ropinirole) for the symptoms. The treatment provided moderate relief.     Review of systems:   ROS negative except were noted in HPI.    Code Status: full code    /75   Pulse 68   Temp 36.6 °C (97.9 °F)   Resp 18   Ht 1.829 m (6')   Wt 130 kg (286 lb)   SpO2 95%   BMI 38.79 kg/m²      Physical Exam  Constitutional:       General: He is not in acute distress.     Appearance: He is obese. He is not toxic-appearing.   HENT:      Head: Normocephalic.      Nose: Nose normal.      Mouth/Throat:      Mouth: Mucous membranes are moist.      Pharynx: Oropharynx is clear.   Eyes:      Extraocular Movements: Extraocular movements intact.      Pupils: Pupils are equal, round, and reactive to light.   Cardiovascular:      Rate and Rhythm: Normal rate and regular rhythm.      Pulses: Normal pulses.   Pulmonary:      Effort: Pulmonary effort is normal.      Breath sounds: Normal breath sounds. No wheezing or rales.   Abdominal:      General: Bowel sounds are normal. There is no distension.      Palpations: Abdomen is soft.      Tenderness: There is no abdominal tenderness.  There is no guarding.   Genitourinary:     Comments: Voiding.  Musculoskeletal:      Cervical back: Normal range of motion.      Right lower leg: Edema present.      Left lower leg: Edema present.   Lymphadenopathy:      Cervical: No cervical adenopathy.   Skin:     General: Skin is warm and dry.      Capillary Refill: Capillary refill takes less than 2 seconds.   Neurological:      General: No focal deficit present.      Mental Status: He is alert and oriented to person, place, and time.   Psychiatric:         Mood and Affect: Mood normal.         Behavior: Behavior norm    Medications reviewed during visit at facility.  Bisacodyl 10 mg suppository daily prn  Colace 100 mg po daily  Citalopram 20 mg po daily  MOM 30 ml po daily prn constipation  Tylenol 650 mg po daily q 4 hours prn  Carbidopa/levodopa 25/100 mg po two capsule daily  Carbidopa/Levodopa 25/100 mg po tid  Loperamide 2 mg po daily  Zofran 4 mg po q 8 hours prn  Voltaren gel 1% to both legs  Ropinirole 6 mg po daily 5 PM  Ropinirole 3 mg po daily 8 AM  Triamcinolone Acetonide Cream 0.1 to affected areas q 6 hours prn  Lisinopril 5 mg po daily  Amlodipine 5 mg po daily  Lasix 40 mg po bid  Labs reviewed at facility:    Laboratory: 2023 15:09 BASIC MET PNL INCL GFR (BMP) / CBC W/O DIFF  Latest Version (more available)  Reviewed by shani on 2023 16:05  Resident Information  Resident: Stephen Olguin ([id number] NF)  Admit Date: 2021  Admitting Provider:   Attending Provider:   Copy to List:   Report Information  Collection Date: 2023 08:05  Received Date: 2023 08:56  Reported Date: 2023 15:09  Ord. Provider: Nelson Mena  Source [place]: [id number]  Lab Information  Status: Completed  Flag:    Reporting Lab:   [facility]  Order #:   [id number]  Vendor Order #:   [id number]  Category:   Chemistry, Hematology  Order Notes  Result for:  STEPHEN OLGUIN  ( 1955, M)         Results   Show All Details Result Units Ref.  Range Flag Status   BASIC MET PNL INCL GFR (BMP)       GLUCOSE  111 mg/dL  H Final      GLUCOSE, FASTING         65-99  mg/dL                               GLUCOSE, NON-FASTING      mg/dL       SODIUM  141 mEq/L 136-145  Final           POTASSIUM  3.9 mEq/L 3.5-5.3  Final           CHLORIDE  109 mEq/L   Final           CARBON DIOXIDE (CO2)  22 mEq/L 21-33  Final           BUN (UREA NITROGEN)  26 mg/dL 7-25 H Final           CREATININE  1.0 mg/dL 0.6-1.2  Final           BUN/CREATININE RATIO  26  6-22 H Final           GFR-AFRICAN AMERICAN  90 mL/min/1.73 m2 >60  Final           GFR-NON-AFRICAN AMERICAN  75 mL/min/1.73 m2 >60  Final           Stage of CKD     eGFR (mL/min/1.73 square meters)          Stage 1        >/= 90        or > 90          Stage 2        60 - 89          Stage 3        30 - 59          Stage 4        15 - 29          Stage 5        </= 14        or < 15  ** GFR is reliable for adults 17 to 69 years with stable kidney      function.        CALCIUM  8.9 mg/dL 8.4-10.2  Final       CBC W/O DIFF       WBC  5.2 K/cmm 4.5-10.8  Final           RBC  4.51 M/cmm 4.00-6.60  Final           HEMOGLOBIN  13.9 g/dL 14.0-18.0 L Final           HEMATOCRIT  42.1 % 42.0-54.0  Final           MCV  93.4 fL 80.0-100.0  Final           MCH  30.8 pg 26.0-35.0  Final           MCHC  33.0 g/dL 31.0-36.5  Final           RDW  13.3 % 11.0-16.0  Final           PLATELET  201 K/cmm 150-450  Final           MPV  8.5 fL 6.5-12.0  Final  Assessment/Plan   Problem List Items Addressed This Visit       Antalgic gait     Ambulatory using U Step walker. Uses gym here at  [facility]. Has routine of daily walk outside on property grounds.          Lymphedema of both lower extremities     Reorder Lasix 40 mg po bid. Review labs.         Parkinson's disease (CMS/[facility]) - Primary     Provide prescription for carbidopa/levodopa.          Restless leg syndrome     Provide prescription of Ropinirole              Time:    Noah Hall PA-C       Electronically Signed By: Noah Hall PA-C   8/24/23 10:06 PM

## 2023-08-23 NOTE — PROGRESS NOTES
8/22/2023  Name: Stephen Varner  YOB: 1955    Chief complaint: Medication review. Follow up for impaired mobility.    HPI: Patient remains active despite history of Parkinson's. Patient maintains daily routine of walking around nursing home grounds each day. He would like his medications reviewed and prescriptions provided to maintain existing therapy. Denies fever, chill, dizziness, chest pain, or falls.    Extremity Weakness  The current episode started more than 1 year ago. The problem occurs daily. The problem has been unchanged. Pertinent negatives include no anorexia, arthralgias, change in bowel habit, chills, fever, joint swelling, urinary symptoms, vertigo or visual change. Nothing aggravates the symptoms. He has tried walking (Carbidopa/Levodopa. Ropinirole) for the symptoms. The treatment provided moderate relief.     Review of systems:   ROS negative except were noted in HPI.    Code Status: full code    /75   Pulse 68   Temp 36.6 °C (97.9 °F)   Resp 18   Ht 1.829 m (6')   Wt 130 kg (286 lb)   SpO2 95%   BMI 38.79 kg/m²      Physical Exam  Constitutional:       General: He is not in acute distress.     Appearance: He is obese. He is not toxic-appearing.   HENT:      Head: Normocephalic.      Nose: Nose normal.      Mouth/Throat:      Mouth: Mucous membranes are moist.      Pharynx: Oropharynx is clear.   Eyes:      Extraocular Movements: Extraocular movements intact.      Pupils: Pupils are equal, round, and reactive to light.   Cardiovascular:      Rate and Rhythm: Normal rate and regular rhythm.      Pulses: Normal pulses.   Pulmonary:      Effort: Pulmonary effort is normal.      Breath sounds: Normal breath sounds. No wheezing or rales.   Abdominal:      General: Bowel sounds are normal. There is no distension.      Palpations: Abdomen is soft.      Tenderness: There is no abdominal tenderness. There is no guarding.   Genitourinary:     Comments:  Voiding.  Musculoskeletal:      Cervical back: Normal range of motion.      Right lower leg: Edema present.      Left lower leg: Edema present.   Lymphadenopathy:      Cervical: No cervical adenopathy.   Skin:     General: Skin is warm and dry.      Capillary Refill: Capillary refill takes less than 2 seconds.   Neurological:      General: No focal deficit present.      Mental Status: He is alert and oriented to person, place, and time.   Psychiatric:         Mood and Affect: Mood normal.         Behavior: Behavior norm    Medications reviewed during visit at facility.  Bisacodyl 10 mg suppository daily prn  Colace 100 mg po daily  Citalopram 20 mg po daily  MOM 30 ml po daily prn constipation  Tylenol 650 mg po daily q 4 hours prn  Carbidopa/levodopa 25/100 mg po two capsule daily  Carbidopa/Levodopa 25/100 mg po tid  Loperamide 2 mg po daily  Zofran 4 mg po q 8 hours prn  Voltaren gel 1% to both legs  Ropinirole 6 mg po daily 5 PM  Ropinirole 3 mg po daily 8 AM  Triamcinolone Acetonide Cream 0.1 to affected areas q 6 hours prn  Lisinopril 5 mg po daily  Amlodipine 5 mg po daily  Lasix 40 mg po bid  Labs reviewed at facility:    Laboratory: 2023 15:09 BASIC MET PNL INCL GFR (BMP) / CBC W/O DIFF  Latest Version (more available)  Reviewed by shani on 2023 16:05  Resident Information  Resident: Stephen Olguin ([id number] NF)  Admit Date: 2021  Admitting Provider:   Attending Provider:   Copy to List:   Report Information  Collection Date: 2023 08:05  Received Date: 2023 08:56  Reported Date: 2023 15:09  Ord. Provider: Nelson Mena  Source [place]: [email or web address]  Lab Information  Status: Completed  Flag:    Reporting Lab:   [facility]  Order #:   [id number]  Vendor Order #:   [id number]  Category:   Chemistry, Hematology  Order Notes  Result for:  STEPHEN OLGUIN  ( 1955, M)         Results   Show All Details Result Units Ref. Range Flag Status   BASIC MET PNL INCL GFR (BMP)        GLUCOSE  111 mg/dL  H Final      GLUCOSE, FASTING         65-99  mg/dL                               GLUCOSE, NON-FASTING      mg/dL       SODIUM  141 mEq/L 136-145  Final           POTASSIUM  3.9 mEq/L 3.5-5.3  Final           CHLORIDE  109 mEq/L   Final           CARBON DIOXIDE (CO2)  22 mEq/L 21-33  Final           BUN (UREA NITROGEN)  26 mg/dL 7-25 H Final           CREATININE  1.0 mg/dL 0.6-1.2  Final           BUN/CREATININE RATIO  26  6-22 H Final           GFR-AFRICAN AMERICAN  90 mL/min/1.73 m2 >60  Final           GFR-NON-AFRICAN AMERICAN  75 mL/min/1.73 m2 >60  Final           Stage of CKD     eGFR (mL/min/1.73 square meters)          Stage 1        >/= 90        or > 90          Stage 2        60 - 89          Stage 3        30 - 59          Stage 4        15 - 29          Stage 5        </= 14        or < 15  ** GFR is reliable for adults 17 to 69 years with stable kidney      function.        CALCIUM  8.9 mg/dL 8.4-10.2  Final       CBC W/O DIFF       WBC  5.2 K/cmm 4.5-10.8  Final           RBC  4.51 M/cmm 4.00-6.60  Final           HEMOGLOBIN  13.9 g/dL 14.0-18.0 L Final           HEMATOCRIT  42.1 % 42.0-54.0  Final           MCV  93.4 fL 80.0-100.0  Final           MCH  30.8 pg 26.0-35.0  Final           MCHC  33.0 g/dL 31.0-36.5  Final           RDW  13.3 % 11.0-16.0  Final           PLATELET  201 K/cmm 150-450  Final           MPV  8.5 fL 6.5-12.0  Final  Assessment/Plan    Problem List Items Addressed This Visit       Antalgic gait     Ambulatory using U Step walker. Uses gym here at  [facility]. Has routine of daily walk outside on property grounds.          Lymphedema of both lower extremities     Reorder Lasix 40 mg po bid. Review labs.         Parkinson's disease (CMS/[facility]) - Primary     Provide prescription for carbidopa/levodopa.          Restless leg syndrome     Provide prescription of Ropinirole             Time:    Noah Hall PA-C

## 2023-08-24 ASSESSMENT — ENCOUNTER SYMPTOMS
VISUAL CHANGE: 0
EXTREMITY WEAKNESS: 1
CHANGE IN BOWEL HABIT: 0
ARTHRALGIAS: 0
JOINT SWELLING: 0
FEVER: 0
ANOREXIA: 0
VERTIGO: 0
CHILLS: 0

## 2023-09-01 ENCOUNTER — NURSING HOME VISIT (OUTPATIENT)
Dept: INTERNAL MEDICINE | Facility: HOSPITAL | Age: 68
End: 2023-09-01
Payer: COMMERCIAL

## 2023-09-01 DIAGNOSIS — E66.01 OBESITY, MORBID, BMI 40.0-49.9 (MULTI): ICD-10-CM

## 2023-09-01 DIAGNOSIS — N13.8 BPH WITH OBSTRUCTION/LOWER URINARY TRACT SYMPTOMS: ICD-10-CM

## 2023-09-01 DIAGNOSIS — R26.81 UNSTEADY GAIT: ICD-10-CM

## 2023-09-01 DIAGNOSIS — G25.81 RESTLESS LEG SYNDROME: ICD-10-CM

## 2023-09-01 DIAGNOSIS — I87.2 VENOUS INSUFFICIENCY: ICD-10-CM

## 2023-09-01 DIAGNOSIS — I51.7 LVH (LEFT VENTRICULAR HYPERTROPHY): ICD-10-CM

## 2023-09-01 DIAGNOSIS — I15.9 SECONDARY HYPERTENSION: Primary | ICD-10-CM

## 2023-09-01 DIAGNOSIS — N40.1 BPH WITH OBSTRUCTION/LOWER URINARY TRACT SYMPTOMS: ICD-10-CM

## 2023-09-01 DIAGNOSIS — I89.0 LYMPHEDEMA OF BOTH LOWER EXTREMITIES: ICD-10-CM

## 2023-09-01 DIAGNOSIS — I89.0 LYMPHEDEMA: ICD-10-CM

## 2023-09-01 DIAGNOSIS — G47.00 INSOMNIA, UNSPECIFIED TYPE: ICD-10-CM

## 2023-09-01 DIAGNOSIS — I87.2 VENOUS STASIS DERMATITIS OF LOWER EXTREMITY: ICD-10-CM

## 2023-09-01 DIAGNOSIS — G20.A1 PARKINSON'S DISEASE (MULTI): ICD-10-CM

## 2023-09-01 NOTE — PROGRESS NOTES
Patient seen at [facility].    Chief Complaint:  Follow up    HPI:  Patient residing at SNF for therapy in setting of Parkinson's disease. Denies chest pain or shortness of breath.     Patient has no new complaints at this time. Patient is participating in therapy sessions.    ROS:  12-pt ROS was reviewed with patient and was negative, unless otherwise noted in HPI.    PMHx; SocHx; FamHx; Allergies; Medications: all reviewed, see CNP notes, EMR, and records for further details.    LABS: available labs were reviewed    VITALS: vitals reviewed, see EMR for further details    PHYSICAL EXAM:  GEN: calm, no acute distress  HEENT: PER, EOMI, MMM  NECK: supple  CV: S1, S2, RRR  PULM: unlabored breathing, CTAB  ABD: soft, NT  Extremities: B/L lower extremity edema  Neuro: no new gross focal deficits  PSYCH: appropriate affect    Parkinson's Disease  B/L Lower Extremity Lymphedema  Restless leg syndrome    PLAN:  Continue therapy with PT/OT/ST as indicated.  Prior records and hospital notes reviewed.  I agree with plan of care as detailed in CNP note.  Fall precautions.  I discussed case with nursing staff.  Advised close PCP fu as outpatient as indicated.    Alexis Hwang D.O. PGY3    Trainee role: Resident    I saw and evaluated the patient. I personally obtained the key and critical portions of the history and physical exam or was physically present for key and critical portions performed by the trainee. I reviewed the trainee's documentation and discussed the patient with the trainee. I agree with the trainee's medical decision making as documented on the trainee's notes.    Nelson Mena M.D.

## 2023-10-12 ENCOUNTER — NURSING HOME VISIT (OUTPATIENT)
Dept: POST ACUTE CARE | Facility: EXTERNAL LOCATION | Age: 68
End: 2023-10-12
Payer: COMMERCIAL

## 2023-10-12 VITALS
TEMPERATURE: 98.1 F | OXYGEN SATURATION: 94 % | RESPIRATION RATE: 18 BRPM | HEIGHT: 72 IN | HEART RATE: 68 BPM | BODY MASS INDEX: 39.68 KG/M2 | DIASTOLIC BLOOD PRESSURE: 72 MMHG | SYSTOLIC BLOOD PRESSURE: 116 MMHG | WEIGHT: 293 LBS

## 2023-10-12 DIAGNOSIS — G20.B1 PARKINSON'S DISEASE WITH DYSKINESIA WITHOUT FLUCTUATING MANIFESTATIONS (MULTI): ICD-10-CM

## 2023-10-12 DIAGNOSIS — R26.81 UNSTEADY GAIT: Primary | ICD-10-CM

## 2023-10-12 DIAGNOSIS — G25.81 RESTLESS LEG SYNDROME: ICD-10-CM

## 2023-10-12 DIAGNOSIS — I89.0 LYMPHEDEMA OF BOTH LOWER EXTREMITIES: ICD-10-CM

## 2023-10-12 PROCEDURE — 99309 SBSQ NF CARE MODERATE MDM 30: CPT | Performed by: PHYSICIAN ASSISTANT

## 2023-10-12 NOTE — LETTER
Patient: Stephen Varner  : 1955    Encounter Date: 10/12/2023    10/11/2023  Name: Stephen Varner  YOB: 1955    Chief complaint: Ataxic gait secondary to Parkinson's disease.    HPI: Patient has unsteady gait associated with advanced Parkinson's disease. He requires wheeled walker for safe ambulation. Patient enjoy daily walking throughout the dg.,and outside on facility grounds, weather permitting. Patient would benefit from a securing a newer walker which provide greater safety and stability. Patient compliant with all prescribed medications.     Extremity Weakness  This is a chronic problem. The current episode started more than 1 year ago. The problem occurs daily. The problem has been gradually worsening. Associated symptoms include weakness. Pertinent negatives include no arthralgias, change in bowel habit, chest pain, chills, fever or joint swelling. The symptoms are aggravated by walking. Treatments tried: walker. levodopa/carbidopa.       Review of systems:   ROS negative except were noted in HPI.    Code Status: Full code.    /72   Pulse 68   Temp 36.7 °C (98.1 °F)   Resp 18   Ht 1.829 m (6')   Wt 133 kg (293 lb)   SpO2 94%   BMI 39.74 kg/m²         Physical Exam  Constitutional:       General: He is not in acute distress.     Appearance: He is obese. He is not toxic-appearing.   HENT:      Head: Normocephalic.      Nose: Nose normal.      Mouth/Throat:      Mouth: Mucous membranes are moist.      Pharynx: Oropharynx is clear.   Eyes:      Extraocular Movements: Extraocular movements intact.      Pupils: Pupils are equal, round, and reactive to light.   Cardiovascular:      Rate and Rhythm: Normal rate and regular rhythm.      Pulses: Normal pulses.   Pulmonary:      Effort: Pulmonary effort is normal.      Breath sounds: Normal breath sounds. No wheezing or rales.   Abdominal:      General: Bowel sounds are normal. There is no distension.      Palpations: Abdomen is  soft.      Tenderness: There is no abdominal tenderness. There is no guarding.   Genitourinary:     Comments: Voiding.  Musculoskeletal:      Cervical back: Normal range of motion.      Right lower leg: Edema present.      Left lower leg: Edema present.   Lymphadenopathy:      Cervical: No cervical adenopathy.   Skin:     General: Skin is warm and dry.      Capillary Refill: Capillary refill takes less than 2 seconds.   Neurological:      General: No focal deficit present.      Mental Status: He is alert and oriented to person, place, and time.   Psychiatric:         Mood and Affect: Mood normal.         Behavior: Behavior norm    Medications reviewed during visit at facility.  Bisacodyl 10 mg suppository daily prn  Colace 100 mg po daily  Citalopram 20 mg po daily  MOM 30 ml po daily prn constipation  Tylenol 650 mg po daily q 4 hours prn  Carbidopa/levodopa 25/100 mg po two capsule daily  Carbidopa/Levodopa 25/100 mg po tid  Loperamide 2 mg po daily  Zofran 4 mg po q 8 hours prn  Voltaren gel 1% to both legs  Ropinirole 6 mg po daily 5 PM  Ropinirole 3 mg po daily 8 AM  Triamcinolone Acetonide Cream 0.1 to affected areas q 6 hours prn  Lisinopril 5 mg po daily  Amlodipine 5 mg po daily  Lasix 40 mg po bid  Labs reviewed at facility:    Assessment/Plan   Problem List Items Addressed This Visit       Lymphedema of both lower extremities     Compression pump to both lower extremities once a day. Lasix 40 mg po bid. Order BMP CBC.                Parkinson's disease     Carbidopa-Levodopa Tablet  mg po qid. Carbidopa-Levodopa Oral Tablet  at 1600. Schedule follow up appointment with neuologist         Restless leg syndrome     Ropinirole 3 mg po daily 8 AM. Ropinirole 6 mg po daily 5 PM.          Unsteady gait - Primary     Patient will require 4 wheeled walker to provide safe ambulation where as a cane does not provide equivalent  benefit. He has advanced parkinson's disease, restless leg syndrome and  chronic lymphedema of lower extremities. These co morbidities limit his ability for safe ambulation. Agree with request for new 4 wheeled walker.            Time:    Noah Hall PA-C       Electronically Signed By: Noah Hall PA-C   10/14/23  6:17 PM

## 2023-10-12 NOTE — PROGRESS NOTES
10/11/2023  Name: Stephen Varner  YOB: 1955    Chief complaint: Ataxic gait secondary to Parkinson's disease.    HPI: Patient has unsteady gait associated with advanced Parkinson's disease. He requires wheeled walker for safe ambulation. Patient enjoy daily walking throughout the [facility]and outside on facility grounds, weather permitting. Patient would benefit from a securing a newer walker which provide greater safety and stability. Patient compliant with all prescribed medications.     Extremity Weakness  This is a chronic problem. The current episode started more than 1 year ago. The problem occurs daily. The problem has been gradually worsening. Associated symptoms include weakness. Pertinent negatives include no arthralgias, change in bowel habit, chest pain, chills, fever or joint swelling. The symptoms are aggravated by walking. Treatments tried: walker. levodopa/carbidopa.       Review of systems:   ROS negative except were noted in HPI.    Code Status: Full code.    /72   Pulse 68   Temp 36.7 °C (98.1 °F)   Resp 18   Ht 1.829 m (6')   Wt 133 kg (293 lb)   SpO2 94%   BMI 39.74 kg/m²         Physical Exam  Constitutional:       General: He is not in acute distress.     Appearance: He is obese. He is not toxic-appearing.   HENT:      Head: Normocephalic.      Nose: Nose normal.      Mouth/Throat:      Mouth: Mucous membranes are moist.      Pharynx: Oropharynx is clear.   Eyes:      Extraocular Movements: Extraocular movements intact.      Pupils: Pupils are equal, round, and reactive to light.   Cardiovascular:      Rate and Rhythm: Normal rate and regular rhythm.      Pulses: Normal pulses.   Pulmonary:      Effort: Pulmonary effort is normal.      Breath sounds: Normal breath sounds. No wheezing or rales.   Abdominal:      General: Bowel sounds are normal. There is no distension.      Palpations: Abdomen is soft.      Tenderness: There is no abdominal tenderness. There is no  guarding.   Genitourinary:     Comments: Voiding.  Musculoskeletal:      Cervical back: Normal range of motion.      Right lower leg: Edema present.      Left lower leg: Edema present.   Lymphadenopathy:      Cervical: No cervical adenopathy.   Skin:     General: Skin is warm and dry.      Capillary Refill: Capillary refill takes less than 2 seconds.   Neurological:      General: No focal deficit present.      Mental Status: He is alert and oriented to person, place, and time.   Psychiatric:         Mood and Affect: Mood normal.         Behavior: Behavior norm    Medications reviewed during visit at facility.  Bisacodyl 10 mg suppository daily prn  Colace 100 mg po daily  Citalopram 20 mg po daily  MOM 30 ml po daily prn constipation  Tylenol 650 mg po daily q 4 hours prn  Carbidopa/levodopa 25/100 mg po two capsule daily  Carbidopa/Levodopa 25/100 mg po tid  Loperamide 2 mg po daily  Zofran 4 mg po q 8 hours prn  Voltaren gel 1% to both legs  Ropinirole 6 mg po daily 5 PM  Ropinirole 3 mg po daily 8 AM  Triamcinolone Acetonide Cream 0.1 to affected areas q 6 hours prn  Lisinopril 5 mg po daily  Amlodipine 5 mg po daily  Lasix 40 mg po bid  Labs reviewed at facility:    Assessment/Plan    Problem List Items Addressed This Visit       Lymphedema of both lower extremities     Compression pump to both lower extremities once a day. Lasix 40 mg po bid. Order [facility].                Parkinson's disease     Carbidopa-Levodopa Tablet  mg po qid. Carbidopa-Levodopa Oral Tablet  at 1600. Schedule follow up appointment with neuologist         Restless leg syndrome     Ropinirole 3 mg po daily 8 AM. Ropinirole 6 mg po daily 5 PM.          Unsteady gait - Primary     Patient will require 4 wheeled walker to provide safe ambulation where as a cane does not provide equivalent  benefit. He has advanced parkinson's disease, restless leg syndrome and chronic lymphedema of lower extremities. These co morbidities limit his  ability for safe ambulation. Agree with request for new 4 wheeled walker.        Addendum: Patient will  require a U Step walker to provide safe ambulation were as a cane does not provide equivalent benefit. He has advanced Parkinson's disease, restless leg syndrome, and chronic lymphedema of lower extremities. These co morbidities limit his ability for safe ambulation. Agree with request for new U Step walker.    Time:    Noah Hall PA-C

## 2023-10-14 ASSESSMENT — ENCOUNTER SYMPTOMS
CHANGE IN BOWEL HABIT: 0
CHILLS: 0
WEAKNESS: 1
ARTHRALGIAS: 0
EXTREMITY WEAKNESS: 1
FEVER: 0
JOINT SWELLING: 0

## 2023-10-14 NOTE — ASSESSMENT & PLAN NOTE
Compression pump to both lower extremities once a day. Lasix 40 mg po bid. Order BMP CBC.

## 2023-10-14 NOTE — ASSESSMENT & PLAN NOTE
Carbidopa-Levodopa Tablet  mg po qid. Carbidopa-Levodopa Oral Tablet  at 1600. Schedule follow up appointment with neuologist

## 2023-10-14 NOTE — ASSESSMENT & PLAN NOTE
Patient will require 4 wheeled walker to provide safe ambulation where as a cane does not provide equivalent  benefit. He has advanced parkinson's disease, restless leg syndrome and chronic lymphedema of lower extremities. These co morbidities limit his ability for safe ambulation. Agree with request for new 4 wheeled walker.   REVIEW OF SYSTEMS:    CONSTITUTIONAL: No weakness, fevers or chills, +weight loss  EYES/ENT: No visual changes;  No vertigo or throat pain   NECK: No pain or stiffness  RESPIRATORY: No cough, wheezing, hemoptysis; No shortness of breath  CARDIOVASCULAR: No chest pain or palpitations, PATRICK  GASTROINTESTINAL: No abdominal or epigastric pain. No nausea, vomiting, or hematemesis; No diarrhea or constipation. No melena or hematochezia.  GENITOURINARY: No dysuria, frequency or hematuria  NEUROLOGICAL: No numbness or weakness, AAOX3  SKIN: No itching, rashes  MUSCULOSKELETAL: no joint erythema, no joint swelling  PSYCHIATRIC: no depression, no anxiety

## 2023-11-14 ENCOUNTER — NURSING HOME VISIT (OUTPATIENT)
Dept: POST ACUTE CARE | Facility: EXTERNAL LOCATION | Age: 68
End: 2023-11-14
Payer: COMMERCIAL

## 2023-11-14 VITALS
WEIGHT: 292 LBS | SYSTOLIC BLOOD PRESSURE: 145 MMHG | TEMPERATURE: 98.6 F | OXYGEN SATURATION: 96 % | BODY MASS INDEX: 39.55 KG/M2 | HEART RATE: 78 BPM | RESPIRATION RATE: 18 BRPM | HEIGHT: 72 IN | DIASTOLIC BLOOD PRESSURE: 85 MMHG

## 2023-11-14 DIAGNOSIS — I15.9 SECONDARY HYPERTENSION: Primary | ICD-10-CM

## 2023-11-14 DIAGNOSIS — I89.0 LYMPHEDEMA: ICD-10-CM

## 2023-11-14 DIAGNOSIS — G20.B1 PARKINSON'S DISEASE WITH DYSKINESIA WITHOUT FLUCTUATING MANIFESTATIONS (MULTI): ICD-10-CM

## 2023-11-14 DIAGNOSIS — G25.81 RESTLESS LEG SYNDROME: ICD-10-CM

## 2023-11-14 PROCEDURE — 99309 SBSQ NF CARE MODERATE MDM 30: CPT | Performed by: PHYSICIAN ASSISTANT

## 2023-11-14 NOTE — LETTER
Patient: Stephen Varner  : 1955    Encounter Date: 2023  Name: Stephen Varner  YOB: 1955    Chief complaint: Follow up for hypertension    HPI: Patient seen and examined in his room. He is visiting with his friend and is in no acute distress. Patient is wearing pneumatic compression sleeves to both legs for history of chronic lower leg lymphedema. Review of chart shows average systolic blood pressure between 140 - 155. He will need Amlodipine prescription refill today.     Hypertension  This is a chronic problem. The current episode started more than 1 year ago. The problem is unchanged. The problem is controlled. Associated symptoms include peripheral edema. Pertinent negatives include no anxiety, blurred vision, chest pain, headaches or palpitations. There are no associated agents to hypertension. Risk factors for coronary artery disease include male gender and obesity. Past treatments include calcium channel blockers, diuretics and ACE inhibitors. The current treatment provides moderate improvement. There are no compliance problems.  none. Identifiable causes of hypertension include sleep apnea. There is no history of chronic renal disease.     Review of systems:   ROS negative except were noted in HPI.    Code Status: Full code    /85   Pulse 78   Temp 37 °C (98.6 °F)   Resp 18   Ht 1.829 m (6')   Wt 132 kg (292 lb)   SpO2 96%   BMI 39.60 kg/m²      Physical Exam  Constitutional:       General: He is not in acute distress.     Appearance: He is obese. He is not toxic-appearing.   HENT:      Head: Normocephalic.      Nose: Nose normal.      Mouth/Throat:      Mouth: Mucous membranes are moist.      Pharynx: Oropharynx is clear.   Eyes:      Extraocular Movements: Extraocular movements intact.      Pupils: Pupils are equal, round, and reactive to light.   Cardiovascular:      Rate and Rhythm: Normal rate and regular rhythm.      Pulses: Normal pulses.  "  Pulmonary:      Effort: Pulmonary effort is normal.      Breath sounds: Normal breath sounds. No wheezing or rales.   Abdominal:      General: Bowel sounds are normal. There is no distension.      Palpations: Abdomen is soft.      Tenderness: There is no abdominal tenderness. There is no guarding.   Genitourinary:     Comments: Voiding.  Musculoskeletal:      Cervical back: Normal range of motion.      Right lower leg: Edema present.      Left lower leg: Edema present.   Lymphadenopathy:      Cervical: No cervical adenopathy.   Skin:     General: Skin is warm and dry.      Capillary Refill: Capillary refill takes less than 2 seconds.   Neurological:      General: No focal deficit present.      Mental Status: He is alert and oriented to person, place, and time.   Psychiatric:         Mood and Affect: Mood normal.         Behavior: Behavior norm     Medications reviewed during visit at facility.  Bisacodyl 10 mg suppository daily prn  Colace 100 mg po daily  Citalopram 20 mg po daily  MOM 30 ml po daily prn constipation  Tylenol 650 mg po daily q 4 hours prn  Carbidopa/levodopa 25/100 mg po two capsule daily  Carbidopa/Levodopa 25/100 mg po tid  Loperamide 2 mg po daily  Zofran 4 mg po q 8 hours prn  Voltaren gel 1% to both legs  Ropinirole 6 mg po daily 5 PM  Ropinirole 3 mg po daily 8 AM  Triamcinolone Acetonide Cream 0.1 to affected areas q 6 hours prn  Lisinopril 5 mg po daily  Amlodipine 5 mg po daily  Lasix 40 mg po bid  Labs reviewed at facility:    Assessment/Plan   Problem List Items Addressed This Visit       Secondary hypertension - Primary     Re order Amlodipine. Continue with Lisinopril and Lasix. Order CMP and CBC with diff.         Lymphedema     Apply compression pump to BLE between 5-6am Q day per \"R\" request         Parkinson's disease     Carbidopa-Levodopa Tablet  mg po qid. Carbidopa-Levodopa Oral Tablet  at 1600. Schedule follow up appointment with neurologist Dr. Farzaneh Morrow  "          Restless leg syndrome     Ropinirole 3 mg po daily 8 AM. Ropinirole 6 mg po daily 5 PM.              Time:    Noah Hall PA-C       Electronically Signed By: Noah Hall PA-C   11/15/23 11:32 AM

## 2023-11-15 ASSESSMENT — ENCOUNTER SYMPTOMS
PALPITATIONS: 0
BLURRED VISION: 0
HYPERTENSION: 1
HEADACHES: 0

## 2023-11-15 NOTE — PROGRESS NOTES
11/14/2023  Name: Stephen Varner  YOB: 1955    Chief complaint: Follow up for hypertension    HPI: Patient seen and examined in his room. He is visiting with his friend and is in no acute distress. Patient is wearing pneumatic compression sleeves to both legs for history of chronic lower leg lymphedema. Review of chart shows average systolic blood pressure between 140 - 155. He will need Amlodipine prescription refill today.     Hypertension  This is a chronic problem. The current episode started more than 1 year ago. The problem is unchanged. The problem is controlled. Associated symptoms include peripheral edema. Pertinent negatives include no anxiety, blurred vision, chest pain, headaches or palpitations. There are no associated agents to hypertension. Risk factors for coronary artery disease include male gender and obesity. Past treatments include calcium channel blockers, diuretics and ACE inhibitors. The current treatment provides moderate improvement. There are no compliance problems.  none. Identifiable causes of hypertension include sleep apnea. There is no history of chronic renal disease.     Review of systems:   ROS negative except were noted in HPI.    Code Status: Full code    /85   Pulse 78   Temp 37 °C (98.6 °F)   Resp 18   Ht 1.829 m (6')   Wt 132 kg (292 lb)   SpO2 96%   BMI 39.60 kg/m²      Physical Exam  Constitutional:       General: He is not in acute distress.     Appearance: He is obese. He is not toxic-appearing.   HENT:      Head: Normocephalic.      Nose: Nose normal.      Mouth/Throat:      Mouth: Mucous membranes are moist.      Pharynx: Oropharynx is clear.   Eyes:      Extraocular Movements: Extraocular movements intact.      Pupils: Pupils are equal, round, and reactive to light.   Cardiovascular:      Rate and Rhythm: Normal rate and regular rhythm.      Pulses: Normal pulses.   Pulmonary:      Effort: Pulmonary effort is normal.      Breath sounds:  "Normal breath sounds. No wheezing or rales.   Abdominal:      General: Bowel sounds are normal. There is no distension.      Palpations: Abdomen is soft.      Tenderness: There is no abdominal tenderness. There is no guarding.   Genitourinary:     Comments: Voiding.  Musculoskeletal:      Cervical back: Normal range of motion.      Right lower leg: Edema present.      Left lower leg: Edema present.   Lymphadenopathy:      Cervical: No cervical adenopathy.   Skin:     General: Skin is warm and dry.      Capillary Refill: Capillary refill takes less than 2 seconds.   Neurological:      General: No focal deficit present.      Mental Status: He is alert and oriented to person, place, and time.   Psychiatric:         Mood and Affect: Mood normal.         Behavior: Behavior norm     Medications reviewed during visit at facility.  Bisacodyl 10 mg suppository daily prn  Colace 100 mg po daily  Citalopram 20 mg po daily  MOM 30 ml po daily prn constipation  Tylenol 650 mg po daily q 4 hours prn  Carbidopa/levodopa 25/100 mg po two capsule daily  Carbidopa/Levodopa 25/100 mg po tid  Loperamide 2 mg po daily  Zofran 4 mg po q 8 hours prn  Voltaren gel 1% to both legs  Ropinirole 6 mg po daily 5 PM  Ropinirole 3 mg po daily 8 AM  Triamcinolone Acetonide Cream 0.1 to affected areas q 6 hours prn  Lisinopril 5 mg po daily  Amlodipine 5 mg po daily  Lasix 40 mg po bid  Labs reviewed at facility:    Assessment/Plan    Problem List Items Addressed This Visit       Secondary hypertension - Primary     Re order Amlodipine. Continue with Lisinopril and Lasix. Order CMP and CBC with diff.         Lymphedema     Apply compression pump to BLE between 5-6am Q day per \"R\" request         Parkinson's disease     Carbidopa-Levodopa Tablet  mg po qid. Carbidopa-Levodopa Oral Tablet  at 1600. Schedule follow up appointment with neurologist Dr. Farzaneh Morrow           Restless leg syndrome     Ropinirole 3 mg po daily 8 AM. " Ropinirole 6 mg po daily 5 PM.              Time:    Noah Hall PA-C

## 2023-11-15 NOTE — ASSESSMENT & PLAN NOTE
Carbidopa-Levodopa Tablet  mg po qid. Carbidopa-Levodopa Oral Tablet  at 1600. Schedule follow up appointment with neurologist Dr. Farzaneh Morrow

## 2023-12-14 ENCOUNTER — NURSING HOME VISIT (OUTPATIENT)
Dept: POST ACUTE CARE | Facility: EXTERNAL LOCATION | Age: 68
End: 2023-12-14
Payer: COMMERCIAL

## 2023-12-14 VITALS
SYSTOLIC BLOOD PRESSURE: 113 MMHG | HEART RATE: 66 BPM | DIASTOLIC BLOOD PRESSURE: 73 MMHG | BODY MASS INDEX: 39.28 KG/M2 | WEIGHT: 290 LBS | HEIGHT: 72 IN | OXYGEN SATURATION: 95 % | TEMPERATURE: 98.1 F | RESPIRATION RATE: 18 BRPM

## 2023-12-14 DIAGNOSIS — G20.B1 PARKINSON'S DISEASE WITH DYSKINESIA WITHOUT FLUCTUATING MANIFESTATIONS (MULTI): Primary | ICD-10-CM

## 2023-12-14 PROCEDURE — G0439 PPPS, SUBSEQ VISIT: HCPCS | Performed by: PHYSICIAN ASSISTANT

## 2023-12-14 ASSESSMENT — ACTIVITIES OF DAILY LIVING (ADL)
BATHING: NEEDS ASSISTANCE
DRESSING: NEEDS ASSISTANCE

## 2023-12-14 ASSESSMENT — ENCOUNTER SYMPTOMS
LOSS OF SENSATION IN FEET: 1
OCCASIONAL FEELINGS OF UNSTEADINESS: 0
DEPRESSION: 0

## 2023-12-14 NOTE — PROGRESS NOTES
Subjective   Reason for Visit: Stephen Varner is an 68 y.o. male here for a Medicare Wellness visit.     Past Medical, Surgical, and Family History reviewed and updated in chart.     Reviewed all medications by prescribing practitioner or clinical pharmacist (such as prescriptions, OTCs, herbal therapies and supplements) and documented in the medical record.    HPI: This is a 67 year old  male who has a medical history remarkable for Parkinson's disease, anxiety disorder, hypertension, BPH, restless leg syndrome, MDD, and lymphedema. Patient is a long term resident at [facility].             Patient Care Team:  Nelson Mena MD as PCP - General (Internal Medicine)     Objective   Vitals:  /73   Pulse 66   Temp 36.7 °C (98.1 °F)   Resp 18   Ht 1.829 m (6')   Wt 132 kg (290 lb)   SpO2 95%   BMI 39.33 kg/m²       Physical Exam  Constitutional:       General: He is not in acute distress.     Appearance: He is obese. He is not toxic-appearing.   HENT:      Head: Normocephalic.      Nose: Nose normal.      Mouth/Throat:      Mouth: Mucous membranes are moist.      Pharynx: Oropharynx is clear.   Eyes:      Extraocular Movements: Extraocular movements intact.      Pupils: Pupils are equal, round, and reactive to light.   Cardiovascular:      Rate and Rhythm: Normal rate and regular rhythm.      Pulses: Normal pulses.   Pulmonary:      Effort: Pulmonary effort is normal.      Breath sounds: Normal breath sounds. No wheezing or rales.   Abdominal:      General: Bowel sounds are normal. There is no distension.      Palpations: Abdomen is soft.      Tenderness: There is no abdominal tenderness. There is no guarding.   Genitourinary:     Comments: Voiding.  Musculoskeletal:      Cervical back: Normal range of motion.      Right lower leg: Edema present.      Left lower leg: Edema present.   Lymphadenopathy:      Cervical: No cervical adenopathy.   Skin:     General: Skin is warm and dry.       Capillary Refill: Capillary refill takes less than 2 seconds.   Neurological:      General: No focal deficit present.      Mental Status: He is alert and oriented to person, place, and time.   Psychiatric:         Mood and Affect: Mood normal.         Behavior: Behavior norm    Assessment/Plan   Problem List Items Addressed This Visit       Parkinson's disease (Multi) - Primary    Current Assessment & Plan     Carbidopa-Levodopa Tablet  mg po qid. Carbidopa-Levodopa Oral Tablet  at 1600. Patient encouraged to follow up with a neurologist. He has seen Dr. Morrow in the past.

## 2023-12-14 NOTE — LETTER
Patient: Stephen Varner  : 1955    Encounter Date: 2023    Subjective  Reason for Visit: Stephen Varner is an 68 y.o. male here for a Medicare Wellness visit.     Past Medical, Surgical, and Family History reviewed and updated in chart.     Reviewed all medications by prescribing practitioner or clinical pharmacist (such as prescriptions, OTCs, herbal therapies and supplements) and documented in the medical record.    HPI: This is a 67 year old  male who has a medical history remarkable for Parkinson's disease, anxiety disorder, hypertension, BPH, restless leg syndrome, MDD, and lymphedema. Patient is a long term resident at [facility].             Patient Care Team:  Nelson Mena MD as PCP - General (Internal Medicine)     Objective  Vitals:  /73   Pulse 66   Temp 36.7 °C (98.1 °F)   Resp 18   Ht 1.829 m (6')   Wt 132 kg (290 lb)   SpO2 95%   BMI 39.33 kg/m²       Physical Exam  Constitutional:       General: He is not in acute distress.     Appearance: He is obese. He is not toxic-appearing.   HENT:      Head: Normocephalic.      Nose: Nose normal.      Mouth/Throat:      Mouth: Mucous membranes are moist.      Pharynx: Oropharynx is clear.   Eyes:      Extraocular Movements: Extraocular movements intact.      Pupils: Pupils are equal, round, and reactive to light.   Cardiovascular:      Rate and Rhythm: Normal rate and regular rhythm.      Pulses: Normal pulses.   Pulmonary:      Effort: Pulmonary effort is normal.      Breath sounds: Normal breath sounds. No wheezing or rales.   Abdominal:      General: Bowel sounds are normal. There is no distension.      Palpations: Abdomen is soft.      Tenderness: There is no abdominal tenderness. There is no guarding.   Genitourinary:     Comments: Voiding.  Musculoskeletal:      Cervical back: Normal range of motion.      Right lower leg: Edema present.      Left lower leg: Edema present.   Lymphadenopathy:      Cervical: No  cervical adenopathy.   Skin:     General: Skin is warm and dry.      Capillary Refill: Capillary refill takes less than 2 seconds.   Neurological:      General: No focal deficit present.      Mental Status: He is alert and oriented to person, place, and time.   Psychiatric:         Mood and Affect: Mood normal.         Behavior: Behavior norm    Assessment/Plan  Problem List Items Addressed This Visit       Parkinson's disease - Primary              Electronically Signed By: Noah Hall PA-C   12/14/23  3:07 PM

## 2024-01-11 ENCOUNTER — NURSING HOME VISIT (OUTPATIENT)
Dept: POST ACUTE CARE | Facility: EXTERNAL LOCATION | Age: 69
End: 2024-01-11
Payer: COMMERCIAL

## 2024-01-11 DIAGNOSIS — G20.B1 PARKINSON'S DISEASE WITH DYSKINESIA WITHOUT FLUCTUATING MANIFESTATIONS (MULTI): Primary | ICD-10-CM

## 2024-01-11 DIAGNOSIS — G25.81 RESTLESS LEG SYNDROME: ICD-10-CM

## 2024-01-11 DIAGNOSIS — I89.0 LYMPHEDEMA OF BOTH LOWER EXTREMITIES: ICD-10-CM

## 2024-01-11 PROCEDURE — 99309 SBSQ NF CARE MODERATE MDM 30: CPT | Performed by: STUDENT IN AN ORGANIZED HEALTH CARE EDUCATION/TRAINING PROGRAM

## 2024-01-11 NOTE — LETTER
Patient: Stephen Varner  : 1955    Encounter Date: 2024    Chief Complaint:  Follow-up visit    HPI:  Patient complains of chronic BLE edema. He ambulates the halls. Edema waxes and wanes, seems to improve with exercise. He has several questions today.    ROS:  12-pt ROS was reviewed with patient and was negative, unless otherwise noted in HPI.    PMHx; SocHx; FamHx; Allergies; Medications: all reviewed, see PA notes, EMR, and records for further details.    LABS: available labs were reviewed    VITALS: vitals reviewed, see EMR for further details    PHYSICAL EXAM:  GEN: calm, no acute distress  HEENT: PER, EOMI, MMM  NECK: supple  CV: S1, S2, RRR  PULM: unlabored breathing, CTAB  ABD: soft, NT  Neuro: no new gross focal deficits  PSYCH: appropriate affect  EXT: BLE edema    ASSESSMENT:  Parkinson's Disease  B/L Lower Extremity Lymphedema  Restless leg syndrome     PLAN:  PT/OT/ST as indicated.  Prior records and hospital notes reviewed.  I agree with plan of care as detailed in PA note.  Fall precautions.  I discussed case with nursing staff.  Multiple questions answered.  Advised close PCP fu as outpatient as indicated.    Keo Meléndez DO   Resident PGY-3    Trainee role: Resident    I saw and evaluated the patient. I personally obtained the key and critical portions of the history and physical exam or was physically present for key and critical portions performed by the trainee. I reviewed the trainee's documentation and discussed the patient with the trainee. I agree with the trainee's medical decision making as documented on the trainee's notes.    Nelson Mena M.D.        Electronically Signed By: Nelson Mena MD   24 11:33 AM

## 2024-01-11 NOTE — PROGRESS NOTES
Chief Complaint:  Follow-up visit    HPI:  Patient complains of chronic BLE edema. He ambulates the halls. Edema waxes and wanes, seems to improve with exercise. He has several questions today.    ROS:  12-pt ROS was reviewed with patient and was negative, unless otherwise noted in HPI.    PMHx; SocHx; FamHx; Allergies; Medications: all reviewed, see PA notes, EMR, and records for further details.    LABS: available labs were reviewed    VITALS: vitals reviewed, see EMR for further details    PHYSICAL EXAM:  GEN: calm, no acute distress  HEENT: PER, EOMI, MMM  NECK: supple  CV: S1, S2, RRR  PULM: unlabored breathing, CTAB  ABD: soft, NT  Neuro: no new gross focal deficits  PSYCH: appropriate affect  EXT: BLE edema    ASSESSMENT:  Parkinson's Disease  B/L Lower Extremity Lymphedema  Restless leg syndrome     PLAN:  PT/OT/ST as indicated.  Prior records and hospital notes reviewed.  I agree with plan of care as detailed in PA note.  Fall precautions.  I discussed case with nursing staff.  Multiple questions answered.  Advised close PCP fu as outpatient as indicated.    Keo Meléndez,    Resident PGY-3    Trainee role: Resident    I saw and evaluated the patient. I personally obtained the key and critical portions of the history and physical exam or was physically present for key and critical portions performed by the trainee. I reviewed the trainee's documentation and discussed the patient with the trainee. I agree with the trainee's medical decision making as documented on the trainee's notes.    Nelson Mena M.D.

## 2024-01-16 PROBLEM — N40.1 BENIGN PROSTATIC HYPERPLASIA WITH URINARY OBSTRUCTION: Status: ACTIVE | Noted: 2021-03-17

## 2024-01-16 PROBLEM — N13.8 BENIGN PROSTATIC HYPERPLASIA WITH URINARY OBSTRUCTION: Status: ACTIVE | Noted: 2021-03-17

## 2024-01-16 PROBLEM — I10 HYPERTENSION: Status: ACTIVE | Noted: 2023-04-10

## 2024-03-05 ENCOUNTER — NURSING HOME VISIT (OUTPATIENT)
Dept: POST ACUTE CARE | Facility: EXTERNAL LOCATION | Age: 69
End: 2024-03-05
Payer: COMMERCIAL

## 2024-03-05 VITALS
RESPIRATION RATE: 18 BRPM | OXYGEN SATURATION: 94 % | HEART RATE: 62 BPM | WEIGHT: 296 LBS | DIASTOLIC BLOOD PRESSURE: 81 MMHG | SYSTOLIC BLOOD PRESSURE: 143 MMHG | HEIGHT: 70 IN | TEMPERATURE: 97 F | BODY MASS INDEX: 42.37 KG/M2

## 2024-03-05 DIAGNOSIS — I89.0 LYMPHEDEMA OF BOTH LOWER EXTREMITIES: ICD-10-CM

## 2024-03-05 DIAGNOSIS — G25.81 RESTLESS LEG SYNDROME: ICD-10-CM

## 2024-03-05 DIAGNOSIS — R25.2 CRAMPS OF LOWER EXTREMITY: Primary | ICD-10-CM

## 2024-03-05 DIAGNOSIS — G20.B1 PARKINSON'S DISEASE WITH DYSKINESIA WITHOUT FLUCTUATING MANIFESTATIONS (MULTI): ICD-10-CM

## 2024-03-05 PROCEDURE — 99309 SBSQ NF CARE MODERATE MDM 30: CPT | Performed by: PHYSICIAN ASSISTANT

## 2024-03-05 NOTE — LETTER
"Patient: Stephen Varner  : 1955    Encounter Date: 2024    3/5/2024  Name: Stephen Varner  YOB: 1955    Chief complaint: Cramping in lower extremities.    HPI: Patient is a long term resident who has a medical history remarkable for Parkinson\"s, restless leg syndrome, and lymphedema of lower extremities. He report having daily \" cramping\" in both feet over the last week. Currently receiving Lasix 40 mg po bid.    Leg Pain   The incident occurred more than 1 week ago. There was no injury mechanism. The pain is present in the left foot and right foot. The pain is at a severity of 3/10. The pain is mild. The pain has been Fluctuating since onset. Pertinent negatives include no inability to bear weight, loss of motion, loss of sensation or numbness. He reports no foreign bodies present. The symptoms are aggravated by movement. He has tried acetaminophen and rest (ropinirole.) for the symptoms. The treatment provided moderate relief.     Review of systems:   ROS negative except were noted in HPI.    Code Status: full code    /81   Pulse 62   Temp 36.1 °C (97 °F)   Resp 18   Ht 1.778 m (5' 10\")   Wt 134 kg (296 lb)   SpO2 94%   BMI 42.47 kg/m²      Physical Exam  Constitutional:       General: He is not in acute distress.     Appearance: He is obese. He is not toxic-appearing.   HENT:      Head: Normocephalic.      Nose: Nose normal.      Mouth/Throat:      Mouth: Mucous membranes are moist.      Pharynx: Oropharynx is clear.   Eyes:      Extraocular Movements: Extraocular movements intact.      Pupils: Pupils are equal, round, and reactive to light.   Cardiovascular:      Rate and Rhythm: Normal rate and regular rhythm.      Pulses: Normal pulses.   Pulmonary:      Effort: Pulmonary effort is normal.      Breath sounds: Normal breath sounds. No wheezing or rales.   Abdominal:      General: Bowel sounds are normal. There is no distension.      Palpations: Abdomen is soft.      " Tenderness: There is no abdominal tenderness. There is no guarding.   Genitourinary:     Comments: Voiding.  Musculoskeletal:      Cervical back: Normal range of motion.      Right lower leg: Edema present.      Left lower leg: Edema present.   Lymphadenopathy:      Cervical: No cervical adenopathy.   Skin:     General: Skin is warm and dry.      Capillary Refill: Capillary refill takes less than 2 seconds.   Neurological:      General: No focal deficit present.      Mental Status: He is alert and oriented to person, place, and time.   Psychiatric:         Mood and Affect: Mood normal.         Behavior: Behavior normal    Medications reviewed during visit at facility.  Bisacodyl 10 mg suppository daily prn  Colace 100 mg po daily  Citalopram 20 mg po daily  MOM 30 ml po daily prn constipation  Tylenol 650 mg po daily q 4 hours prn  Carbidopa/levodopa 25/100 mg po two capsule daily  Carbidopa/Levodopa 25/100 mg po tid  Loperamide 2 mg po daily  Zofran 4 mg po q 8 hours prn  Voltaren gel 1% to both legs  Ropinirole 6 mg po daily 5 PM  Ropinirole 4 mg po daily 8 AM  Triamcinolone Acetonide Cream 0.1 to affected areas q 6 hours prn  Lisinopril 5 mg po daily  Amlodipine 5 mg po daily  Lasix 40 mg po bid  Labs reviewed at facility:    Assessment/Plan   Problem List Items Addressed This Visit       Lymphedema of both lower extremities     Compression pump to both lower extremities once a day. Lasix 40 mg po bid                   Parkinson's disease     Carbidopa-Levodopa Tablet  mg po qid. Carbidopa-Levodopa Oral Tablet  at 1600. Schedule follow up appointment with neurologist Dr. Farzaneh Morrow           Restless leg syndrome     Ropinirole 4 mg po daily 8 AM. Ropinirole 6 mg po daily 5 PM.           Cramps of lower extremity - Primary     Order CMP CBC with diff. If electrolytes are normal, will consider increasing Ropinirole.            Time:    Noah Hall PA-C       Electronically Signed By:  Noah Hall PA-C   3/7/24 10:32 AM

## 2024-03-06 NOTE — PROGRESS NOTES
"3/5/2024  Name: Stephen Varner  YOB: 1955    Chief complaint: Cramping in lower extremities.    HPI: Patient is a long term resident who has a medical history remarkable for Parkinson\"s, restless leg syndrome, and lymphedema of lower extremities. He report having daily \" cramping\" in both feet over the last week. Currently receiving Lasix 40 mg po bid.    Leg Pain   The incident occurred more than 1 week ago. There was no injury mechanism. The pain is present in the left foot and right foot. The pain is at a severity of 3/10. The pain is mild. The pain has been Fluctuating since onset. Pertinent negatives include no inability to bear weight, loss of motion, loss of sensation or numbness. He reports no foreign bodies present. The symptoms are aggravated by movement. He has tried acetaminophen and rest (ropinirole.) for the symptoms. The treatment provided moderate relief.     Review of systems:   ROS negative except were noted in HPI.    Code Status: full code    /81   Pulse 62   Temp 36.1 °C (97 °F)   Resp 18   Ht 1.778 m (5' 10\")   Wt 134 kg (296 lb)   SpO2 94%   BMI 42.47 kg/m²      Physical Exam  Constitutional:       General: He is not in acute distress.     Appearance: He is obese. He is not toxic-appearing.   HENT:      Head: Normocephalic.      Nose: Nose normal.      Mouth/Throat:      Mouth: Mucous membranes are moist.      Pharynx: Oropharynx is clear.   Eyes:      Extraocular Movements: Extraocular movements intact.      Pupils: Pupils are equal, round, and reactive to light.   Cardiovascular:      Rate and Rhythm: Normal rate and regular rhythm.      Pulses: Normal pulses.   Pulmonary:      Effort: Pulmonary effort is normal.      Breath sounds: Normal breath sounds. No wheezing or rales.   Abdominal:      General: Bowel sounds are normal. There is no distension.      Palpations: Abdomen is soft.      Tenderness: There is no abdominal tenderness. There is no guarding. "   Genitourinary:     Comments: Voiding.  Musculoskeletal:      Cervical back: Normal range of motion.      Right lower leg: Edema present.      Left lower leg: Edema present.   Lymphadenopathy:      Cervical: No cervical adenopathy.   Skin:     General: Skin is warm and dry.      Capillary Refill: Capillary refill takes less than 2 seconds.   Neurological:      General: No focal deficit present.      Mental Status: He is alert and oriented to person, place, and time.   Psychiatric:         Mood and Affect: Mood normal.         Behavior: Behavior normal    Medications reviewed during visit at facility.  Bisacodyl 10 mg suppository daily prn  Colace 100 mg po daily  Citalopram 20 mg po daily  MOM 30 ml po daily prn constipation  Tylenol 650 mg po daily q 4 hours prn  Carbidopa/levodopa 25/100 mg po two capsule daily  Carbidopa/Levodopa 25/100 mg po tid  Loperamide 2 mg po daily  Zofran 4 mg po q 8 hours prn  Voltaren gel 1% to both legs  Ropinirole 6 mg po daily 5 PM  Ropinirole 4 mg po daily 8 AM  Triamcinolone Acetonide Cream 0.1 to affected areas q 6 hours prn  Lisinopril 5 mg po daily  Amlodipine 5 mg po daily  Lasix 40 mg po bid  Labs reviewed at facility:    Assessment/Plan    Problem List Items Addressed This Visit       Lymphedema of both lower extremities     Compression pump to both lower extremities once a day. Lasix 40 mg po bid                   Parkinson's disease     Carbidopa-Levodopa Tablet  mg po qid. Carbidopa-Levodopa Oral Tablet  at 1600. Schedule follow up appointment with neurologist Dr. Farzaneh Morrow           Restless leg syndrome     Ropinirole 4 mg po daily 8 AM. Ropinirole 6 mg po daily 5 PM.           Cramps of lower extremity - Primary     Order CMP CBC with diff. If electrolytes are normal, will consider increasing Ropinirole.            Time:    Noah Hall PA-C

## 2024-03-07 PROBLEM — R25.2 CRAMPS OF LOWER EXTREMITY: Status: ACTIVE | Noted: 2024-03-07

## 2024-03-07 ASSESSMENT — ENCOUNTER SYMPTOMS
NUMBNESS: 0
LEG PAIN: 1
LOSS OF SENSATION: 0
INABILITY TO BEAR WEIGHT: 0
LOSS OF MOTION: 0

## 2024-04-09 ENCOUNTER — APPOINTMENT (OUTPATIENT)
Dept: NEUROLOGY | Facility: CLINIC | Age: 69
End: 2024-04-09
Payer: COMMERCIAL

## 2024-04-15 ENCOUNTER — NURSING HOME VISIT (OUTPATIENT)
Dept: POST ACUTE CARE | Facility: EXTERNAL LOCATION | Age: 69
End: 2024-04-15
Payer: COMMERCIAL

## 2024-04-15 DIAGNOSIS — I89.0 LYMPHEDEMA OF BOTH LOWER EXTREMITIES: ICD-10-CM

## 2024-04-15 DIAGNOSIS — G25.81 RESTLESS LEG SYNDROME: ICD-10-CM

## 2024-04-15 DIAGNOSIS — G20.B1 PARKINSON'S DISEASE WITH DYSKINESIA WITHOUT FLUCTUATING MANIFESTATIONS (MULTI): Primary | ICD-10-CM

## 2024-04-15 PROCEDURE — 99308 SBSQ NF CARE LOW MDM 20: CPT | Performed by: STUDENT IN AN ORGANIZED HEALTH CARE EDUCATION/TRAINING PROGRAM

## 2024-04-15 NOTE — PROGRESS NOTES
Chief Complaint:  Follow-up visit    HPI:  Patient complains of chronic BLE edema. He ambulates the halls. He has already participated in rehab this morning.    ROS:  12-pt ROS was reviewed with patient and was negative, unless otherwise noted in HPI.    PMHx; SocHx; FamHx; Allergies; Medications: all reviewed, see PA notes, EMR, and records for further details.    LABS: available labs were reviewed    VITALS: vitals reviewed, see EMR for further details    PHYSICAL EXAM:  GEN: calm, no acute distress  HEENT: PER, EOMI, MMM  NECK: supple  CV: S1, S2, RRR  PULM: unlabored breathing, CTAB  ABD: soft, NT  Neuro: no new gross focal deficits  PSYCH: appropriate affect  EXT: BLE edema    ASSESSMENT:  Parkinson's Disease  B/L Lower Extremity Lymphedema  Restless leg syndrome     PLAN:  PT/OT/ST as indicated.  Prior records and hospital notes reviewed.  I agree with plan of care as detailed in PA note.  Fall precautions.  I discussed case with nursing staff.  Multiple questions answered.  Advised close PCP fu as outpatient as indicated.    Mal Quesada DO   Resident PGY-3    Trainee role: Resident    I saw and evaluated the patient. I personally obtained the key and critical portions of the history and physical exam or was physically present for key and critical portions performed by the trainee. I reviewed the trainee's documentation and discussed the patient with the trainee. I agree with the trainee's medical decision making as documented on the trainee's notes.    Nelson Mena M.D.

## 2024-04-15 NOTE — LETTER
Patient: Stephen Varner  : 1955    Encounter Date: 04/15/2024    Chief Complaint:  Follow-up visit    HPI:  Patient complains of chronic BLE edema. He ambulates the halls. He has already participated in rehab this morning.    ROS:  12-pt ROS was reviewed with patient and was negative, unless otherwise noted in HPI.    PMHx; SocHx; FamHx; Allergies; Medications: all reviewed, see PA notes, EMR, and records for further details.    LABS: available labs were reviewed    VITALS: vitals reviewed, see EMR for further details    PHYSICAL EXAM:  GEN: calm, no acute distress  HEENT: PER, EOMI, MMM  NECK: supple  CV: S1, S2, RRR  PULM: unlabored breathing, CTAB  ABD: soft, NT  Neuro: no new gross focal deficits  PSYCH: appropriate affect  EXT: BLE edema    ASSESSMENT:  Parkinson's Disease  B/L Lower Extremity Lymphedema  Restless leg syndrome     PLAN:  PT/OT/ST as indicated.  Prior records and hospital notes reviewed.  I agree with plan of care as detailed in PA note.  Fall precautions.  I discussed case with nursing staff.  Multiple questions answered.  Advised close PCP fu as outpatient as indicated.    Mal Quesada DO   Resident PGY-3    Trainee role: Resident    I saw and evaluated the patient. I personally obtained the key and critical portions of the history and physical exam or was physically present for key and critical portions performed by the trainee. I reviewed the trainee's documentation and discussed the patient with the trainee. I agree with the trainee's medical decision making as documented on the trainee's notes.    Nelson Mena M.D.        Electronically Signed By: Nelson Mena MD   24  7:41 PM

## 2024-05-07 ENCOUNTER — NURSING HOME VISIT (OUTPATIENT)
Dept: POST ACUTE CARE | Facility: EXTERNAL LOCATION | Age: 69
End: 2024-05-07
Payer: COMMERCIAL

## 2024-05-07 DIAGNOSIS — I10 PRIMARY HYPERTENSION: ICD-10-CM

## 2024-05-07 DIAGNOSIS — G20.B1 PARKINSON'S DISEASE WITH DYSKINESIA WITHOUT FLUCTUATING MANIFESTATIONS (MULTI): Primary | ICD-10-CM

## 2024-05-07 DIAGNOSIS — F33.40 RECURRENT MAJOR DEPRESSIVE DISORDER, IN REMISSION (CMS-HCC): ICD-10-CM

## 2024-05-07 DIAGNOSIS — I89.0 LYMPHEDEMA OF BOTH LOWER EXTREMITIES: ICD-10-CM

## 2024-05-07 PROCEDURE — 99308 SBSQ NF CARE LOW MDM 20: CPT | Performed by: PHYSICIAN ASSISTANT

## 2024-05-07 NOTE — LETTER
"Patient: Stephen Varner  : 1955    Encounter Date: 2024  Name: Stephen Varner  YOB: 1955    Chief complaint: Follow up for Parkinson's    HPI: Patient seen and examined in his room. He is standing with walker for support. Medications reviewed, and reordered. He remains ambulatory and offers no new complaints.     Extremity Weakness  This is a chronic problem. The current episode started more than 1 year ago. The problem occurs daily. The problem has been unchanged. Pertinent negatives include no abdominal pain, anorexia, arthralgias, change in bowel habit, chest pain, chills, fever, headaches, myalgias or urinary symptoms. The symptoms are aggravated by exertion. He has tried relaxation, rest and walking for the symptoms. The treatment provided moderate relief.     Review of systems:   ROS negative except were noted in HPI.    Code Status: full code    /80   Pulse 60   Temp 36.7 °C (98 °F)   Resp 18   Ht 1.778 m (5' 10\")   Wt 134 kg (295 lb)   SpO2 95%   BMI 42.33 kg/m²      Physical Exam  Constitutional:       General: He is not in acute distress.     Appearance: He is obese. He is not toxic-appearing.   HENT:      Head: Normocephalic.      Nose: Nose normal.      Mouth/Throat:      Mouth: Mucous membranes are moist.      Pharynx: Oropharynx is clear.   Eyes:      Extraocular Movements: Extraocular movements intact.      Pupils: Pupils are equal, round, and reactive to light.   Cardiovascular:      Rate and Rhythm: Normal rate and regular rhythm.      Pulses: Normal pulses.   Pulmonary:      Effort: Pulmonary effort is normal.      Breath sounds: Normal breath sounds. No wheezing or rales.   Abdominal:      General: Bowel sounds are normal. There is no distension.      Palpations: Abdomen is soft.      Tenderness: There is no abdominal tenderness. There is no guarding.   Genitourinary:     Comments: Voiding.  Musculoskeletal:      Cervical back: Normal range " "of motion.      Right lower leg: Edema present.      Left lower leg: Edema present.   Lymphadenopathy:      Cervical: No cervical adenopathy.   Skin:     General: Skin is warm and dry.      Capillary Refill: Capillary refill takes less than 2 seconds.   Neurological:      General: No focal deficit present.      Mental Status: He is alert and oriented to person, place, and time.   Psychiatric:         Mood and Affect: Mood normal.         Behavior: Behavior normal    Medications reviewed during visit at facility.  Bisacodyl 10 mg suppository daily prn  Colace 100 mg po daily  Citalopram 20 mg po daily  MOM 30 ml po daily prn constipation  Tylenol 650 mg po daily q 4 hours prn  Carbidopa/levodopa 25/100 mg po two capsule daily  Carbidopa/Levodopa 25/100 mg po tid  Loperamide 2 mg po daily  Zofran 4 mg po q 8 hours prn  Voltaren gel 1% to both legs  Ropinirole 6 mg po daily 5 PM  Ropinirole 4 mg po daily 8 AM  Triamcinolone Acetonide Cream 0.1 to affected areas q 6 hours prn  Lisinopril 5 mg po daily  Amlodipine 5 mg po daily  Lasix 40 mg po bid  Labs reviewed at facility:    Assessment/Plan   Problem List Items Addressed This Visit       Hypertension     Review BP readings. Stable. Continue with Amlodipine and Lisinopril.          Lymphedema of both lower extremities     Compression pump to both lower extremities once a day. Lasix 40 mg po bid. Monitor weight.         Parkinson's disease (Multi) - Primary     Carbidopa-Levodopa Tablet  mg po qid. Carbidopa-Levodopa Oral Tablet  at 1600. Patient encouraged to follow up with a neurologist. He has seen Dr. Morrow in the past.            Recurrent major depressive disorder, in remission (CMS-[facility])     Describes his mood as \"good\". Continue with Citalopram 20 mg po daily            Time:    Noah Hall PA-C       Electronically Signed By: Noah Hall PA-C   5/11/24  9:14 AM  "

## 2024-05-08 VITALS
HEART RATE: 60 BPM | TEMPERATURE: 98 F | SYSTOLIC BLOOD PRESSURE: 135 MMHG | HEIGHT: 70 IN | BODY MASS INDEX: 42.23 KG/M2 | DIASTOLIC BLOOD PRESSURE: 80 MMHG | WEIGHT: 295 LBS | RESPIRATION RATE: 18 BRPM | OXYGEN SATURATION: 95 %

## 2024-05-09 NOTE — PROGRESS NOTES
"5/7/2024  Name: Stephen Varner  YOB: 1955    Chief complaint: Follow up for Parkinson's    HPI: Patient seen and examined in his room. He is standing with walker for support. Medications reviewed, and reordered. He remains ambulatory and offers no new complaints.     Extremity Weakness  This is a chronic problem. The current episode started more than 1 year ago. The problem occurs daily. The problem has been unchanged. Pertinent negatives include no abdominal pain, anorexia, arthralgias, change in bowel habit, chest pain, chills, fever, headaches, myalgias or urinary symptoms. The symptoms are aggravated by exertion. He has tried relaxation, rest and walking for the symptoms. The treatment provided moderate relief.     Review of systems:   ROS negative except were noted in HPI.    Code Status: full code    /80   Pulse 60   Temp 36.7 °C (98 °F)   Resp 18   Ht 1.778 m (5' 10\")   Wt 134 kg (295 lb)   SpO2 95%   BMI 42.33 kg/m²      Physical Exam  Constitutional:       General: He is not in acute distress.     Appearance: He is obese. He is not toxic-appearing.   HENT:      Head: Normocephalic.      Nose: Nose normal.      Mouth/Throat:      Mouth: Mucous membranes are moist.      Pharynx: Oropharynx is clear.   Eyes:      Extraocular Movements: Extraocular movements intact.      Pupils: Pupils are equal, round, and reactive to light.   Cardiovascular:      Rate and Rhythm: Normal rate and regular rhythm.      Pulses: Normal pulses.   Pulmonary:      Effort: Pulmonary effort is normal.      Breath sounds: Normal breath sounds. No wheezing or rales.   Abdominal:      General: Bowel sounds are normal. There is no distension.      Palpations: Abdomen is soft.      Tenderness: There is no abdominal tenderness. There is no guarding.   Genitourinary:     Comments: Voiding.  Musculoskeletal:      Cervical back: Normal range of motion.      Right lower leg: Edema present.      Left lower leg: Edema " "present.   Lymphadenopathy:      Cervical: No cervical adenopathy.   Skin:     General: Skin is warm and dry.      Capillary Refill: Capillary refill takes less than 2 seconds.   Neurological:      General: No focal deficit present.      Mental Status: He is alert and oriented to person, place, and time.   Psychiatric:         Mood and Affect: Mood normal.         Behavior: Behavior normal    Medications reviewed during visit at facility.  Bisacodyl 10 mg suppository daily prn  Colace 100 mg po daily  Citalopram 20 mg po daily  MOM 30 ml po daily prn constipation  Tylenol 650 mg po daily q 4 hours prn  Carbidopa/levodopa 25/100 mg po two capsule daily  Carbidopa/Levodopa 25/100 mg po tid  Loperamide 2 mg po daily  Zofran 4 mg po q 8 hours prn  Voltaren gel 1% to both legs  Ropinirole 6 mg po daily 5 PM  Ropinirole 4 mg po daily 8 AM  Triamcinolone Acetonide Cream 0.1 to affected areas q 6 hours prn  Lisinopril 5 mg po daily  Amlodipine 5 mg po daily  Lasix 40 mg po bid  Labs reviewed at facility:    Assessment/Plan    Problem List Items Addressed This Visit       Hypertension     Review BP readings. Stable. Continue with Amlodipine and Lisinopril.          Lymphedema of both lower extremities     Compression pump to both lower extremities once a day. Lasix 40 mg po bid. Monitor weight.         Parkinson's disease (Multi) - Primary     Carbidopa-Levodopa Tablet  mg po qid. Carbidopa-Levodopa Oral Tablet  at 1600. Patient encouraged to follow up with a neurologist. He has seen Dr. Morrow in the past.            Recurrent major depressive disorder, in remission ([facility])     Describes his mood as \"good\". Continue with Citalopram 20 mg po daily            Time:    Noah Hall PA-C   "

## 2024-05-11 ASSESSMENT — ENCOUNTER SYMPTOMS
ARTHRALGIAS: 0
CHANGE IN BOWEL HABIT: 0
ANOREXIA: 0
EXTREMITY WEAKNESS: 1
ABDOMINAL PAIN: 0
MYALGIAS: 0
CHILLS: 0
HEADACHES: 0
FEVER: 0

## 2024-05-11 NOTE — ASSESSMENT & PLAN NOTE
Carbidopa-Levodopa Tablet  mg po qid. Carbidopa-Levodopa Oral Tablet  at 1600. Patient encouraged to follow up with a neurologist. He has seen Dr. Morrow in the past.

## 2024-06-13 ENCOUNTER — NURSING HOME VISIT (OUTPATIENT)
Dept: POST ACUTE CARE | Facility: EXTERNAL LOCATION | Age: 69
End: 2024-06-13
Payer: COMMERCIAL

## 2024-06-13 DIAGNOSIS — R26.81 UNSTEADY GAIT: Primary | ICD-10-CM

## 2024-06-13 DIAGNOSIS — G20.B1 PARKINSON'S DISEASE WITH DYSKINESIA WITHOUT FLUCTUATING MANIFESTATIONS (MULTI): ICD-10-CM

## 2024-06-13 DIAGNOSIS — I10 PRIMARY HYPERTENSION: ICD-10-CM

## 2024-06-13 PROCEDURE — 99309 SBSQ NF CARE MODERATE MDM 30: CPT | Performed by: PHYSICIAN ASSISTANT

## 2024-06-13 NOTE — LETTER
"Patient: Stephen Varner  : 1955    Encounter Date: 2024  Name: Stephen Varner  YOB: 1955    Chief complaint: Impaired mobility. Unsteady gait.    HPI: Patient remains active by walking throughout the building and outside when whether permits. He use a walk for safety. Patient states\"  With Parkinson's, If you don't use it you lose it\". Blood pressure has been well controlled. Patient however, is over weight and has been reminded to consider dietary modifications. Current weight 300 lbs.    Extremity Weakness  This is a chronic problem. The current episode started more than 1 year ago. The problem occurs daily. The problem has been waxing and waning. Pertinent negatives include no abdominal pain, anorexia, arthralgias, change in bowel habit, chest pain, chills, fever, headaches, joint swelling, urinary symptoms or vertigo. The symptoms are aggravated by exertion. He has tried relaxation, walking and rest for the symptoms. The treatment provided moderate relief.     Review of systems:   ROS negative except were noted in HPI.    Code Status: DNR-CC    /64   Pulse 60   Temp 36.6 °C (97.8 °F)   Resp 18   Ht 1.778 m (5' 10\")   Wt 136 kg (300 lb)   SpO2 96%   BMI 43.05 kg/m²      Physical Exam  Constitutional:       General: He is not in acute distress.     Appearance: He is obese. He is not toxic-appearing.   HENT:      Head: Normocephalic.      Nose: Nose normal.      Mouth/Throat:      Mouth: Mucous membranes are moist.      Pharynx: Oropharynx is clear.   Eyes:      Extraocular Movements: Extraocular movements intact.      Pupils: Pupils are equal, round, and reactive to light.   Cardiovascular:      Rate and Rhythm: Normal rate and regular rhythm.      Pulses: Normal pulses.   Pulmonary:      Effort: Pulmonary effort is normal.      Breath sounds: Normal breath sounds. No wheezing or rales.   Abdominal:      General: Bowel sounds are normal. There is no " distension.      Palpations: Abdomen is soft.      Tenderness: There is no abdominal tenderness. There is no guarding.   Genitourinary:     Comments: Voiding.  Musculoskeletal:      Cervical back: Normal range of motion.      Right lower leg: Edema present.      Left lower leg: Edema present.   Lymphadenopathy:      Cervical: No cervical adenopathy.   Skin:     General: Skin is warm and dry.      Capillary Refill: Capillary refill takes less than 2 seconds.   Neurological:      General: No focal deficit present.      Mental Status: He is alert and oriented to person, place, and time.   Psychiatric:         Mood and Affect: Mood normal.         Behavior: Behavior normal    Medications reviewed during visit at facility.  Bisacodyl 10 mg suppository daily prn  Colace 100 mg po daily  Citalopram 20 mg po daily  MOM 30 ml po daily prn constipation  Tylenol 650 mg po daily q 4 hours prn  Carbidopa/levodopa 25/100 mg po two capsule daily  Carbidopa/Levodopa 25/100 mg po tid  Loperamide 2 mg po daily  Zofran 4 mg po q 8 hours prn  Voltaren gel 1% to both legs  Ropinirole 6 mg po daily 5 PM  Ropinirole 4 mg po daily 8 AM  Triamcinolone Acetonide Cream 0.1 to affected areas q 6 hours prn  Lisinopril 5 mg po daily  Amlodipine 5 mg po daily  Lasix 40 mg po bid  Labs reviewed at facility:    Assessment/Plan   Problem List Items Addressed This Visit       Hypertension     Review BP readings. Stable. Continue with Amlodipine and Lisinopril. Order BMP CBC on 6/17/2024.            Parkinson's disease (Multi)     Carbidopa-Levodopa Tablet  mg po qid. Carbidopa-Levodopa Oral Tablet  at 1600.                Unsteady gait - Primary     Using 4 wheeled walker. Ambulatory with exertional dyspnea.             Time:    Noah Hall PA-C       Electronically Signed By: Noah Hall PA-C   6/16/24  5:39 PM

## 2024-06-16 VITALS
BODY MASS INDEX: 42.95 KG/M2 | TEMPERATURE: 97.8 F | WEIGHT: 300 LBS | HEART RATE: 60 BPM | HEIGHT: 70 IN | OXYGEN SATURATION: 96 % | RESPIRATION RATE: 18 BRPM | DIASTOLIC BLOOD PRESSURE: 64 MMHG | SYSTOLIC BLOOD PRESSURE: 142 MMHG

## 2024-06-16 ASSESSMENT — ENCOUNTER SYMPTOMS
ABDOMINAL PAIN: 0
FEVER: 0
JOINT SWELLING: 0
ARTHRALGIAS: 0
HEADACHES: 0
CHANGE IN BOWEL HABIT: 0
ANOREXIA: 0
VERTIGO: 0
EXTREMITY WEAKNESS: 1
CHILLS: 0

## 2024-06-16 NOTE — ASSESSMENT & PLAN NOTE
Carbidopa-Levodopa Tablet  mg po qid. Carbidopa-Levodopa Oral Tablet  at 1600.          Is This A New Presentation, Or A Follow-Up?: Skin Lesion

## 2024-06-16 NOTE — PROGRESS NOTES
"6/13/2024  Name: Stephen Varner  YOB: 1955    Chief complaint: Impaired mobility. Unsteady gait.    HPI: Patient remains active by walking throughout the building and outside when whether permits. He use a walk for safety. Patient states\"  With Parkinson's, If you don't use it you lose it\". Blood pressure has been well controlled. Patient however, is over weight and has been reminded to consider dietary modifications. Current weight 300 lbs.    Extremity Weakness  This is a chronic problem. The current episode started more than 1 year ago. The problem occurs daily. The problem has been waxing and waning. Pertinent negatives include no abdominal pain, anorexia, arthralgias, change in bowel habit, chest pain, chills, fever, headaches, joint swelling, urinary symptoms or vertigo. The symptoms are aggravated by exertion. He has tried relaxation, walking and rest for the symptoms. The treatment provided moderate relief.     Review of systems:   ROS negative except were noted in HPI.    Code Status: DNR-CC    /64   Pulse 60   Temp 36.6 °C (97.8 °F)   Resp 18   Ht 1.778 m (5' 10\")   Wt 136 kg (300 lb)   SpO2 96%   BMI 43.05 kg/m²      Physical Exam  Constitutional:       General: He is not in acute distress.     Appearance: He is obese. He is not toxic-appearing.   HENT:      Head: Normocephalic.      Nose: Nose normal.      Mouth/Throat:      Mouth: Mucous membranes are moist.      Pharynx: Oropharynx is clear.   Eyes:      Extraocular Movements: Extraocular movements intact.      Pupils: Pupils are equal, round, and reactive to light.   Cardiovascular:      Rate and Rhythm: Normal rate and regular rhythm.      Pulses: Normal pulses.   Pulmonary:      Effort: Pulmonary effort is normal.      Breath sounds: Normal breath sounds. No wheezing or rales.   Abdominal:      General: Bowel sounds are normal. There is no distension.      Palpations: Abdomen is soft.      Tenderness: There is no " abdominal tenderness. There is no guarding.   Genitourinary:     Comments: Voiding.  Musculoskeletal:      Cervical back: Normal range of motion.      Right lower leg: Edema present.      Left lower leg: Edema present.   Lymphadenopathy:      Cervical: No cervical adenopathy.   Skin:     General: Skin is warm and dry.      Capillary Refill: Capillary refill takes less than 2 seconds.   Neurological:      General: No focal deficit present.      Mental Status: He is alert and oriented to person, place, and time.   Psychiatric:         Mood and Affect: Mood normal.         Behavior: Behavior normal    Medications reviewed during visit at facility.  Bisacodyl 10 mg suppository daily prn  Colace 100 mg po daily  Citalopram 20 mg po daily  MOM 30 ml po daily prn constipation  Tylenol 650 mg po daily q 4 hours prn  Carbidopa/levodopa 25/100 mg po two capsule daily  Carbidopa/Levodopa 25/100 mg po tid  Loperamide 2 mg po daily  Zofran 4 mg po q 8 hours prn  Voltaren gel 1% to both legs  Ropinirole 6 mg po daily 5 PM  Ropinirole 4 mg po daily 8 AM  Triamcinolone Acetonide Cream 0.1 to affected areas q 6 hours prn  Lisinopril 5 mg po daily  Amlodipine 5 mg po daily  Lasix 40 mg po bid  Labs reviewed at facility:    Assessment/Plan    Problem List Items Addressed This Visit       Hypertension     Review BP readings. Stable. Continue with Amlodipine and Lisinopril. Order [facility] CBC on 6/17/2024.            Parkinson's disease (Multi)     Carbidopa-Levodopa Tablet  mg po qid. Carbidopa-Levodopa Oral Tablet  at 1600.                Unsteady gait - Primary     Using 4 wheeled walker. Ambulatory with exertional dyspnea.             Time:    Noah Hall PA-C

## 2024-06-16 NOTE — ASSESSMENT & PLAN NOTE
Review BP readings. Stable. Continue with Amlodipine and Lisinopril. Order BMP CBC on 6/17/2024.

## 2024-07-16 ENCOUNTER — APPOINTMENT (OUTPATIENT)
Dept: NEUROLOGY | Facility: CLINIC | Age: 69
End: 2024-07-16
Payer: COMMERCIAL

## 2024-07-16 VITALS
WEIGHT: 280 LBS | BODY MASS INDEX: 37.93 KG/M2 | SYSTOLIC BLOOD PRESSURE: 142 MMHG | RESPIRATION RATE: 22 BRPM | HEART RATE: 70 BPM | DIASTOLIC BLOOD PRESSURE: 84 MMHG | TEMPERATURE: 97.1 F | HEIGHT: 72 IN

## 2024-07-16 DIAGNOSIS — G20.C PARKINSONISM, UNSPECIFIED PARKINSONISM TYPE (MULTI): ICD-10-CM

## 2024-07-16 DIAGNOSIS — G25.81 RLS (RESTLESS LEGS SYNDROME): Primary | ICD-10-CM

## 2024-07-16 PROCEDURE — 3008F BODY MASS INDEX DOCD: CPT | Performed by: PSYCHIATRY & NEUROLOGY

## 2024-07-16 PROCEDURE — 3079F DIAST BP 80-89 MM HG: CPT | Performed by: PSYCHIATRY & NEUROLOGY

## 2024-07-16 PROCEDURE — G2211 COMPLEX E/M VISIT ADD ON: HCPCS | Performed by: PSYCHIATRY & NEUROLOGY

## 2024-07-16 PROCEDURE — 1036F TOBACCO NON-USER: CPT | Performed by: PSYCHIATRY & NEUROLOGY

## 2024-07-16 PROCEDURE — 1160F RVW MEDS BY RX/DR IN RCRD: CPT | Performed by: PSYCHIATRY & NEUROLOGY

## 2024-07-16 PROCEDURE — 1126F AMNT PAIN NOTED NONE PRSNT: CPT | Performed by: PSYCHIATRY & NEUROLOGY

## 2024-07-16 PROCEDURE — 99214 OFFICE O/P EST MOD 30 MIN: CPT | Performed by: PSYCHIATRY & NEUROLOGY

## 2024-07-16 PROCEDURE — 1159F MED LIST DOCD IN RCRD: CPT | Performed by: PSYCHIATRY & NEUROLOGY

## 2024-07-16 PROCEDURE — 3077F SYST BP >= 140 MM HG: CPT | Performed by: PSYCHIATRY & NEUROLOGY

## 2024-07-16 RX ORDER — ROPINIROLE 8 MG/1
8 TABLET, EXTENDED RELEASE ORAL NIGHTLY
Qty: 30 TABLET | Refills: 11 | Status: SHIPPED | OUTPATIENT
Start: 2024-07-16 | End: 2025-07-16

## 2024-07-16 ASSESSMENT — PAIN SCALES - GENERAL: PAINLEVEL: 0-NO PAIN

## 2024-07-16 NOTE — PATIENT INSTRUCTIONS
"It was a pleasure seeing you today.     STOP Ropinirole.    Increase Ropinirole ER 8mg daily.    Sinemet can be re evaluated next visit.     Please make a follow up appointment in 4-5 months, a virtual visit is okay in the next 2 months.    For any urgent issues or needing to speak to a medical assistant please call [phone], option 6 during our office hours Monday-Friday 8am-4pm, and leave a voicemail with your concern.  My office will try to reach back you as soon as possible within 24 (business) hours.  If you have an emergency please call 911 or visit a local urgent care or nearest emergency room.      Please understand that Setup is a useful communication tool for simple \"normal\" results or a refill request but I would not recommend using this tool for emergent or urgent issues or for conversations with me.  I am happy to ask my staff to rearrange a follow up visit or a virtual visit sooner than requested if appropriate for your care.    "

## 2024-07-16 NOTE — PROGRESS NOTES
"  Subjective      Stephen Varner is a 69 y.o. year old male is here for follow up for RLS/parkinsonism.  Arrives with wife today who provides history.     HPI  Last seen in September, 10 months ago and several medications have changed.   He is taking Ropinirole 3mg in the morning and Ropinirole 6mg XR at night.  I did not prescribe this dosing/medication regimen.  Prescribed by Dr. Montana Hall. He has burning in feet. A lot of freezing. Last bloodwork jan 2023.   He is still having cramps in feet.   His lymphedema is much worse.  He likes to paint and work.     He feels the levodopa has helped a bit with slowness/ rigidity.       He lives in a nursing home now, seen by Aknita Martinez.  he has FOG in bathroom.   he feels he has side effects to all medication. he stopped gabapentin, did not like \"how he felt\" on it.   uses a walker to walk, still freezing of gait.   no hallucinations.     he asks about his swelling in his legs, he asks about his urinary issues at night.   he has seen several neurologists. first seen by Dr. Call. he was evaluated for DBS, had levodopa responsive testing and had only 13% improvement with Sinemet on UPDRS.  He has had hx of noncompliance of medications.  he has leg swelling which is chronic.   has freezing of gait in doorways.   his med list states he takes SInemet 25-100mg 2 / 1 / 1 / 2 /2 and Sinemet ER 25-100mg qhs.   RLS is bothering him. he is also on Citalopram.     records from Dr. Seals at [facility], his most recent neurologist- leg cramps at night was biggest complaint - no meds have changed this.  Review of Systems    Patient Active Problem List   Diagnosis    Antalgic gait    Anxiety    Hypertension    Benign prostatic hyperplasia with urinary obstruction    Chronic deep vein thrombosis (DVT) (Multi)    Chronic pain in right foot    Disease due to severe acute respiratory syndrome coronavirus 2 (SARS-CoV-2)    Dystrophic nail    Onychocryptosis    Edema    Elevated PSA    " Heel spur, right    Insomnia    Lower extremity edema    LVH (left ventricular hypertrophy)    Lymphedema    Lymphedema of both lower extremities    Mallet toe of right foot    Memory loss    Obesity, morbid, BMI 40.0-49.9 (Multi)    Observed sleep apnea    Onychomycosis    Parkinson's disease (Multi)    Restless leg syndrome    Right knee pain    Short of breath on exertion    Sleep difficulties    Snoring    Spasm of muscle, back    Speech abnormality    Unsteady gait    Varicose veins of legs    Venous insufficiency    Venous stasis dermatitis of lower extremity    Recurrent major depressive disorder, in remission (CMS-HCC)    Cramps of lower extremity     Past Medical History:   Diagnosis Date    Achilles tendinitis, right leg 11/02/2020    Achilles tendinitis of right lower extremity    Cellulitis of left toe 12/28/2021    Paronychia of great toe, left    Cellulitis of right toe 12/28/2021    Paronychia of toe of right foot    Other hypersomnia 12/28/2017    Excessive daytime sleepiness    Pain in left foot 12/28/2021    Left foot pain    Pain in right foot 07/13/2017    Acute foot pain, right    Parkinson's disease (Multi)     Early-onset Parkinson's disease    Personal history of diseases of the skin and subcutaneous tissue 01/23/2018    History of ingrown nail    Personal history of diseases of the skin and subcutaneous tissue 05/30/2017    History of ingrown nail    Personal history of other diseases of the circulatory system     History of hypertension    Personal history of other diseases of the respiratory system     History of bronchitis     Past Surgical History:   Procedure Laterality Date    OTHER SURGICAL HISTORY  10/12/2016    Cauterization Of Hemorrhoids     Social History     Tobacco Use    Smoking status: Never    Smokeless tobacco: Former     Types: Chew     Quit date: 2008   Substance Use Topics    Alcohol use: Never     family history is not on file.    Current Outpatient Medications:      acetaminophen (TylenoL) 325 mg tablet, Take by mouth., Disp: , Rfl:     albuterol 90 mcg/actuation inhaler, Inhale., Disp: , Rfl:     amLODIPine (Norvasc) 10 mg tablet, Take by mouth., Disp: , Rfl:     amLODIPine (Norvasc) 2.5 mg tablet, Take 1 tablet (2.5 mg) by mouth once daily., Disp: , Rfl:     amLODIPine (Norvasc) 5 mg tablet, Take 1 tablet (5 mg) by mouth once daily., Disp: , Rfl:     amoxicillin (Amoxil) 500 mg capsule, , Disp: , Rfl:     amoxicillin (Amoxil) 500 mg tablet, Take 1 tablet (500 mg) by mouth in the morning and 1 tablet (500 mg) at noon and 1 tablet (500 mg) in the evening and 1 tablet (500 mg) before bedtime., Disp: , Rfl:     ascorbic acid (Vitamin C) 500 mg tablet, Take by mouth., Disp: , Rfl:     bisacodyl (Dulcolax) 10 mg suppository, Insert into the rectum., Disp: , Rfl:     carbidopa-levodopa (Sinemet CR)  mg ER tablet, Take by mouth., Disp: , Rfl:     carbidopa-levodopa (Sinemet)  mg tablet, Take by mouth., Disp: , Rfl:     cholecalciferol (Vitamin D-3) 25 MCG (1000 UT) tablet, Take by mouth., Disp: , Rfl:     citalopram (CeleXA) 20 mg tablet, Take 1 tablet (20 mg) by mouth once daily., Disp: , Rfl:     divalproex (Depakote) 125 mg EC tablet, Take by mouth., Disp: , Rfl:     docusate sodium (Colace) 100 mg capsule, Take 1 capsule (100 mg) by mouth 2 times a day as needed., Disp: , Rfl:     fluticasone (Flonase) 50 mcg/actuation nasal spray, Administer into affected nostril(s)., Disp: , Rfl:     furosemide (Lasix) 20 mg tablet, , Disp: , Rfl:     furosemide (Lasix) 40 mg tablet, Take 1 tablet (40 mg) by mouth once daily., Disp: , Rfl:     gabapentin (Neurontin) 300 mg capsule, Take by mouth., Disp: , Rfl:     inulin (Fiber Gummies) 2 gram tablet,chewable, Chew., Disp: , Rfl:     lisinopril 5 mg tablet, Take 1 tablet (5 mg) by mouth once daily., Disp: , Rfl:     LORazepam (Ativan) 1 mg tablet, Take by mouth every 8 hours if needed., Disp: , Rfl:     magnesium hydroxide (Milk of  Magnesia) 400 mg/5 mL suspension, Take by mouth., Disp: , Rfl:     magnesium oxide (Mag-Ox) 400 mg (241.3 mg magnesium) tablet, Take by mouth., Disp: , Rfl:     rivaroxaban (Xarelto) 20 mg tablet, Take 1 tablet (20 mg) by mouth once daily., Disp: 90 tablet, Rfl: 0    rOPINIRole (Requip) 3 mg tablet, Take 1 tablet (3 mg) by mouth once daily in the morning. Take before meals., Disp: 30 tablet, Rfl: 0    rOPINIRole XL (Requip XL) 6 mg 24 hr tablet, Take 1 tablet (6 mg) by mouth once daily., Disp: , Rfl:     triamcinolone (Kenalog) 0.1 % cream, Triamcinolone Acetonide 0.1 % External Cream Quantity: 240  Refills: 0  Start: 21-Dec-2021, Disp: , Rfl:   Allergies   Allergen Reactions    Bee Venom Protein (Honey Bee) Unknown    Other Unknown     There were no vitals taken for this visit.  Neurological Exam/Physical Exam:    Constitutional: General appearance: no acute distress. Pleasant.   Auscultation of Heart: Regular rate and rhythm, no murmurs, normal S1 and S2.   Carotid Arteries: no bruits  Peripheral Vascular Exam: severe lymphedema and leg swelling in b/l legs with chronic vascular skin changes.   Mental status: no distress, alert, interactive and cooperative. Affect is appropriate.   Orientation: oriented to person, oriented to place and oriented to time.   Memory: recent memory intact and remote memory intact.   Slow voice, makes pauses.   Fund of knowledge: Patient displays adequate knowledge of current events.  Eyes: The ophthalmoscopic examination was normal.   Cranial nerve II: Visual fields full to confrontation.   Cranial nerves III, IV, and VI: Pupils round, equally reactive to light; EOMs intact. No nystagmus.   Cranial Nerve V: Facial sensation intact to LT bilaterally.   Cranial nerve VII: no facial droop  Cranial nerve VIII: Hearing is intact  Cranial nerves IX and X: Palate elevates symmetrically.   Cranial nerve XI: Shoulder shrug impaired ( ? Effort)  Cranial nerve XII: Tongue is midline.  Motor:    moderate-severe bradykinesia throughout, worse on L.   Deep Tendon Reflexes: left biceps 2+ , right biceps 2+, left triceps 2+, right triceps 2+, left brachioradialis 2+, right brachioradialis 2+, left patella 2+, right patella 2+, left ankle jerk 1+, right ankle jerk 1+   Plantar Reflex: Toes downgoing to plantar stimulation on the left. Toes downgoing to plantar stimulation on the right.   Sensory Exam: Normal to vibratory sensation  Coordination:  no limb dystaxia and rapid alternating movements are intact.   Gait:  shuffling gait, freezing +. Cannot get out of chair without assistance.     Labs:  CBC:   Lab Results   Component Value Date    WBC 5.3 01/15/2023    HGB 13.0 (L) 01/15/2023    HCT 39.7 (L) 01/15/2023     01/15/2023     BMP:   Lab Results   Component Value Date     01/15/2023    K 3.9 01/15/2023     (H) 01/15/2023    CO2 27 01/15/2023    BUN 17 01/15/2023    CREATININE 0.92 01/15/2023    CALCIUM 8.7 01/15/2023    MG 2.13 05/03/2021     LFT:   Lab Results   Component Value Date    ALKPHOS 67 08/13/2021    BILITOT 0.7 08/13/2021    PROT 7.3 08/13/2021    ALBUMIN 4.5 08/13/2021    ALT 8 (L) 08/13/2021    AST 13 08/13/2021         Assessment/Plan   Problem List Items Addressed This Visit    None  Visit Diagnoses         Codes    RLS (restless legs syndrome)    -  Primary G25.81    Parkinsonism, unspecified Parkinsonism type (Multi)     G20.C        Noncompliance. On too much ropinirole. Leg swelling is so much worse.  Sinemet less responsiveness ? Unclear response.  Will re evaluate this dose next time.  Stop ropinirole 3mg daily and increase ropinirole 6mg ER to 8mg ER at night.   Leg cramps are likely related to neuropathy.   I will need copy of recent labwork.   Consider pain management.  Unclear what is causing his leg cramps. Not likely related to PD. No PD meds have helped that.

## 2024-07-22 ENCOUNTER — NURSING HOME VISIT (OUTPATIENT)
Dept: POST ACUTE CARE | Facility: EXTERNAL LOCATION | Age: 69
End: 2024-07-22
Payer: COMMERCIAL

## 2024-07-22 VITALS
BODY MASS INDEX: 40.85 KG/M2 | OXYGEN SATURATION: 96 % | TEMPERATURE: 98 F | RESPIRATION RATE: 18 BRPM | WEIGHT: 301.2 LBS | HEART RATE: 82 BPM | SYSTOLIC BLOOD PRESSURE: 141 MMHG | DIASTOLIC BLOOD PRESSURE: 79 MMHG

## 2024-07-22 DIAGNOSIS — E66.01 OBESITY, MORBID, BMI 40.0-49.9 (MULTI): ICD-10-CM

## 2024-07-22 DIAGNOSIS — R25.2 LEG CRAMPS: ICD-10-CM

## 2024-07-22 DIAGNOSIS — I87.303 STASIS EDEMA OF BOTH LOWER EXTREMITIES: ICD-10-CM

## 2024-07-22 DIAGNOSIS — I10 PRIMARY HYPERTENSION: Primary | ICD-10-CM

## 2024-07-22 DIAGNOSIS — I89.0 LYMPHEDEMA: ICD-10-CM

## 2024-07-22 PROCEDURE — 99310 SBSQ NF CARE HIGH MDM 45: CPT | Performed by: NURSE PRACTITIONER

## 2024-07-22 NOTE — ASSESSMENT & PLAN NOTE
Not new.  May be related to mild dehydration from diuretic use?  Instructed resident to drink a glass of water when cramps start and to pay attention to see if it relieves the cramps.

## 2024-07-22 NOTE — PROGRESS NOTES
MRN:[id number]     Subjective   Patient ID: Stephen Varner is a 69 y.o. male who presents for htn, leg cramps    Chief complaint and HPI:  asked to see resident at wife's request.  She would like amlodipine stopped secondary to hx of lymphedema.  Resident also c/o foot cramps.     Objective     /79   Pulse 82   Temp 36.7 °C (98 °F)   Resp 18   Wt 137 kg (301 lb 3.2 oz)   SpO2 96%   BMI 40.85 kg/m²    Physical Exam  Constitutional:       General: He is not in acute distress.     Appearance: He is not toxic-appearing.      Comments: WD, obese, NAD.  Sitting in a chair in his room.   HENT:      Head: Normocephalic and atraumatic.      Mouth/Throat:      Mouth: Mucous membranes are moist.      Pharynx: Oropharynx is clear.   Eyes:      Conjunctiva/sclera: Conjunctivae normal.   Cardiovascular:      Rate and Rhythm: Normal rate and regular rhythm.      Pulses: Normal pulses.      Heart sounds: Normal heart sounds.   Pulmonary:      Effort: Pulmonary effort is normal. No respiratory distress.      Breath sounds: Normal breath sounds. No wheezing or rales.   Abdominal:      General: Bowel sounds are normal.      Palpations: Abdomen is soft.   Musculoskeletal:      Comments: Distal legs very large, but minimally pitting.  Calves full.   Skin:     General: Skin is warm and dry.      Comments: (+) PVD changes in shins   Neurological:      Mental Status: He is alert. Mental status is at baseline.     Labs from 6-18-24 reviewed.    Assessment/Plan   Problem List Items Addressed This Visit             ICD-10-CM    Hypertension - Primary I10     At spouse's request, will stop amlodipine.  Discussed with resident.  Will increase Lisinopril.         Lymphedema I89.0    Stasis edema of both lower extremities I87.303     Continue Lasix 40 mg bid         Obesity, morbid, BMI 40.0-49.9 (Multi) E66.01    Leg cramps R25.2     Not new.  May be related to mild dehydration from diuretic use?  Instructed resident to drink a glass  of water when cramps start and to pay attention to see if it relieves the cramps.          Called wife to discuss use of amlodipine.  She was agreeable to having it discontinued and the Lisinopril increased.  Monitor for effect.   Time Spent  Prep time on day of patient encounter: 10 minutes  Time spent directly with patient, family or caregiver: 20 minutes  Documentation Time: 15 minutes    Time spent:  6930-0734 + 8938-0844      Diagnoses and all orders for this visit:  Primary hypertension  Stasis edema of both lower extremities  Lymphedema  Obesity, morbid, BMI 40.0-49.9 (Multi)  Leg cramps

## 2024-07-22 NOTE — LETTER
Patient: Stephen Varner  : 1955    Encounter Date: 2024    MRN:[id number]     Subjective  Patient ID: Stephen Varner is a 69 y.o. male who presents for htn, leg cramps    Chief complaint and HPI:  asked to see resident at wife's request.  She would like amlodipine stopped secondary to hx of lymphedema.  Resident also c/o foot cramps.     Objective    /79   Pulse 82   Temp 36.7 °C (98 °F)   Resp 18   Wt 137 kg (301 lb 3.2 oz)   SpO2 96%   BMI 40.85 kg/m²    Physical Exam  Constitutional:       General: He is not in acute distress.     Appearance: He is not toxic-appearing.      Comments: WD, obese, NAD.  Sitting in a chair in his room.   HENT:      Head: Normocephalic and atraumatic.      Mouth/Throat:      Mouth: Mucous membranes are moist.      Pharynx: Oropharynx is clear.   Eyes:      Conjunctiva/sclera: Conjunctivae normal.   Cardiovascular:      Rate and Rhythm: Normal rate and regular rhythm.      Pulses: Normal pulses.      Heart sounds: Normal heart sounds.   Pulmonary:      Effort: Pulmonary effort is normal. No respiratory distress.      Breath sounds: Normal breath sounds. No wheezing or rales.   Abdominal:      General: Bowel sounds are normal.      Palpations: Abdomen is soft.   Musculoskeletal:      Comments: Distal legs very large, but minimally pitting.  Calves full.   Skin:     General: Skin is warm and dry.      Comments: (+) PVD changes in shins   Neurological:      Mental Status: He is alert. Mental status is at baseline.     Labs from 6-18-24 reviewed.    Assessment/Plan  Problem List Items Addressed This Visit             ICD-10-CM    Hypertension - Primary I10     At spouse's request, will stop amlodipine.  Discussed with resident.  Will increase Lisinopril.         Lymphedema I89.0    Stasis edema of both lower extremities I87.303     Continue Lasix 40 mg bid         Obesity, morbid, BMI 40.0-49.9 (Multi) E66.01    Leg cramps R25.2     Not new.  May be related to  mild dehydration from diuretic use?  Instructed resident to drink a glass of water when cramps start and to pay attention to see if it relieves the cramps.          Called wife to discuss use of amlodipine.  She was agreeable to having it discontinued and the Lisinopril increased.  Monitor for effect.   Time Spent  Prep time on day of patient encounter: 10 minutes  Time spent directly with patient, family or caregiver: 20 minutes  Documentation Time: 15 minutes    Time spent:  0701-4189 + [id number]      Diagnoses and all orders for this visit:  Primary hypertension  Stasis edema of both lower extremities  Lymphedema  Obesity, morbid, BMI 40.0-49.9 (Multi)  Leg cramps      Electronically Signed By: HIGINIO Srinivasan-CNP, DNP   7/22/24 12:39 PM

## 2024-07-25 ENCOUNTER — TELEPHONE (OUTPATIENT)
Dept: NEUROLOGY | Facility: CLINIC | Age: 69
End: 2024-07-25
Payer: COMMERCIAL

## 2024-07-25 NOTE — TELEPHONE ENCOUNTER
Cash presents to the clinic in follow-up for reflux and gastritis.  Patient is currently taking Protonix daily and getting good results with no breakthrough discomfort.    Physical examination the patient is in good spirits in no distress     Assessment:  Stable gastroesophageal reflux disease    Plan:  I would recommend the patient continue his PPI pantoprazole at 40 mg p.o. q.day.  Will plan to see the patient back in 1 year.      I spent 15 minutes during this visit, with more than 50% of the total time spent with face to face counseling and coordinating care for this patient as outlined in the impression and plan. The patient asked appropriate questions which were answered to my ability.  A discussion of expectations also occurred and there was no disagreement.     Wife Emilee [phone] left message stating you changed his Ropinirole dosage last week and he is complaining of extreme cramping and painful burning in feet and legs - please advise

## 2024-07-26 DIAGNOSIS — G62.9 NEUROPATHY: Primary | ICD-10-CM

## 2024-07-31 DIAGNOSIS — G25.81 RLS (RESTLESS LEGS SYNDROME): ICD-10-CM

## 2024-07-31 DIAGNOSIS — G20.A1 PARKINSON'S DISEASE, UNSPECIFIED WHETHER DYSKINESIA PRESENT, UNSPECIFIED WHETHER MANIFESTATIONS FLUCTUATE (MULTI): Primary | ICD-10-CM

## 2024-08-05 RX ORDER — ROPINIROLE 4 MG/1
4 TABLET, FILM COATED ORAL 2 TIMES DAILY
Qty: 180 TABLET | Refills: 0 | Status: SHIPPED | OUTPATIENT
Start: 2024-08-05

## 2024-08-05 NOTE — TELEPHONE ENCOUNTER
OK to dispense 90 day supply.  I am a temporary provider, though.  Refill requests need to go to Noah Hall PA-C.

## 2024-08-08 ENCOUNTER — NURSING HOME VISIT (OUTPATIENT)
Dept: POST ACUTE CARE | Facility: EXTERNAL LOCATION | Age: 69
End: 2024-08-08
Payer: COMMERCIAL

## 2024-08-08 DIAGNOSIS — R60.0 LOWER EXTREMITY EDEMA: ICD-10-CM

## 2024-08-08 DIAGNOSIS — G25.81 RESTLESS LEG SYNDROME: ICD-10-CM

## 2024-08-08 DIAGNOSIS — I10 PRIMARY HYPERTENSION: Primary | ICD-10-CM

## 2024-08-08 NOTE — LETTER
"Patient: Stephen Varner  : 1955    Encounter Date: 2024  Name: Stephen Varner  YOB: 1955    Chief complaint: Hypertension.    HPI: Review of chart shows elevated blood pressure readings over the last week. Patient has been previously controlled on Lisinopril and Lasix. Patient weight has been stable but has been > 300 lbs. Patient is order a heart healthy diet but does have food brought in by his family which may bee contributing to elevated BP readings.     Hypertension  The current episode started more than 1 year ago. The problem has been waxing and waning since onset. The problem is uncontrolled. Associated symptoms include peripheral edema. Pertinent negatives include no anxiety, blurred vision, chest pain, headaches, orthopnea, palpitations or shortness of breath. Risk factors for coronary artery disease include obesity.     Review of systems:   ROS negative except were noted in HPI.    Code Status: full code    BP (!) 172/95   Pulse 64   Temp 36.6 °C (97.9 °F)   Resp 18   Ht 1.778 m (5' 10\")   Wt 137 kg (301 lb)   SpO2 96%   BMI 43.19 kg/m²         Physical Exam  Constitutional:       General: He is not in acute distress.     Appearance: He is obese. He is not toxic-appearing.   HENT:      Head: Normocephalic.      Nose: Nose normal.      Mouth/Throat:      Mouth: Mucous membranes are moist.      Pharynx: Oropharynx is clear.   Eyes:      Extraocular Movements: Extraocular movements intact.      Pupils: Pupils are equal, round, and reactive to light.   Cardiovascular:      Rate and Rhythm: Normal rate and regular rhythm.      Pulses: Normal pulses.   Pulmonary:      Effort: Pulmonary effort is normal.      Breath sounds: Normal breath sounds. No wheezing or rales.   Abdominal:      General: Bowel sounds are normal. There is no distension.      Palpations: Abdomen is soft.      Tenderness: There is no abdominal tenderness. There is no guarding. "   Genitourinary:     Comments: Voiding.  Musculoskeletal:      Cervical back: Normal range of motion.      Right lower leg: Edema present.      Left lower leg: Edema present.   Lymphadenopathy:      Cervical: No cervical adenopathy.   Skin:     General: Skin is warm and dry.      Capillary Refill: Capillary refill takes less than 2 seconds.   Neurological:      General: No focal deficit present.      Mental Status: He is alert and oriented to person, place, and time.   Psychiatric:         Mood and Affect: Mood normal.         Behavior: Behavior normal    Medications reviewed during visit at facility.  Bisacodyl 10 mg suppository daily prn  Colace 100 mg po daily  Citalopram 20 mg po daily  MOM 30 ml po daily prn constipation  Tylenol 650 mg po daily q 4 hours prn  Carbidopa/levodopa 25/100 mg po two capsule daily  Carbidopa/Levodopa 25/100 mg po tid  Loperamide 2 mg po daily  Zofran 4 mg po q 8 hours prn  Voltaren gel 1% to both legs  Ropinirole 8 ER mg daily.  Triamcinolone Acetonide Cream 0.1 to affected areas q 6 hours prn  Lisinopril 10 mg po daily  Lasix 40 mg po bid  Labs reviewed at facility:    Assessment/Plan   Problem List Items Addressed This Visit       Hypertension - Primary     Amlodipine 5 mg po dose stopped on 7/22/2024 per patient request. Lisinopril increased at that time but blood pressure still elevated. Will increase lisinopril and consider adding additional agent.         Lower extremity edema     Lasix 40 mg po bid. Review weight.          Restless leg syndrome     Follow by Dr. Morrow who started Ropinirole 8 ER mg daily at last visit.            Time:    Noah Hall PA-C       Electronically Signed By: Noah Hall PA-C   8/10/24  8:02 PM

## 2024-08-10 VITALS
HEART RATE: 64 BPM | SYSTOLIC BLOOD PRESSURE: 172 MMHG | WEIGHT: 301 LBS | HEIGHT: 70 IN | RESPIRATION RATE: 18 BRPM | TEMPERATURE: 97.9 F | BODY MASS INDEX: 43.09 KG/M2 | DIASTOLIC BLOOD PRESSURE: 95 MMHG | OXYGEN SATURATION: 96 %

## 2024-08-10 ASSESSMENT — ENCOUNTER SYMPTOMS
PALPITATIONS: 0
HYPERTENSION: 1
BLURRED VISION: 0
SHORTNESS OF BREATH: 0
HEADACHES: 0
ORTHOPNEA: 0

## 2024-08-10 NOTE — PROGRESS NOTES
"8/8/2024  Name: Stephen Varner  YOB: 1955    Chief complaint: Hypertension.    HPI: Review of chart shows elevated blood pressure readings over the last week. Patient has been previously controlled on Lisinopril and Lasix. Patient weight has been stable but has been > 300 lbs. Patient is order a heart healthy diet but does have food brought in by his family which may bee contributing to elevated BP readings.     Hypertension  The current episode started more than 1 year ago. The problem has been waxing and waning since onset. The problem is uncontrolled. Associated symptoms include peripheral edema. Pertinent negatives include no anxiety, blurred vision, chest pain, headaches, orthopnea, palpitations or shortness of breath. Risk factors for coronary artery disease include obesity.     Review of systems:   ROS negative except were noted in HPI.    Code Status: full code    BP (!) 172/95   Pulse 64   Temp 36.6 °C (97.9 °F)   Resp 18   Ht 1.778 m (5' 10\")   Wt 137 kg (301 lb)   SpO2 96%   BMI 43.19 kg/m²         Physical Exam  Constitutional:       General: He is not in acute distress.     Appearance: He is obese. He is not toxic-appearing.   HENT:      Head: Normocephalic.      Nose: Nose normal.      Mouth/Throat:      Mouth: Mucous membranes are moist.      Pharynx: Oropharynx is clear.   Eyes:      Extraocular Movements: Extraocular movements intact.      Pupils: Pupils are equal, round, and reactive to light.   Cardiovascular:      Rate and Rhythm: Normal rate and regular rhythm.      Pulses: Normal pulses.   Pulmonary:      Effort: Pulmonary effort is normal.      Breath sounds: Normal breath sounds. No wheezing or rales.   Abdominal:      General: Bowel sounds are normal. There is no distension.      Palpations: Abdomen is soft.      Tenderness: There is no abdominal tenderness. There is no guarding.   Genitourinary:     Comments: Voiding.  Musculoskeletal:      Cervical back: Normal range " of motion.      Right lower leg: Edema present.      Left lower leg: Edema present.   Lymphadenopathy:      Cervical: No cervical adenopathy.   Skin:     General: Skin is warm and dry.      Capillary Refill: Capillary refill takes less than 2 seconds.   Neurological:      General: No focal deficit present.      Mental Status: He is alert and oriented to person, place, and time.   Psychiatric:         Mood and Affect: Mood normal.         Behavior: Behavior normal    Medications reviewed during visit at facility.  Bisacodyl 10 mg suppository daily prn  Colace 100 mg po daily  Citalopram 20 mg po daily  MOM 30 ml po daily prn constipation  Tylenol 650 mg po daily q 4 hours prn  Carbidopa/levodopa 25/100 mg po two capsule daily  Carbidopa/Levodopa 25/100 mg po tid  Loperamide 2 mg po daily  Zofran 4 mg po q 8 hours prn  Voltaren gel 1% to both legs  Ropinirole 8 ER mg daily.  Triamcinolone Acetonide Cream 0.1 to affected areas q 6 hours prn  Lisinopril 10 mg po daily  Lasix 40 mg po bid  Labs reviewed at facility:    Assessment/Plan    Problem List Items Addressed This Visit       Hypertension - Primary     Amlodipine 5 mg po dose stopped on 7/22/2024 per patient request. Lisinopril increased at that time but blood pressure still elevated. Will increase lisinopril and consider adding additional agent.         Lower extremity edema     Lasix 40 mg po bid. Review weight.          Restless leg syndrome     Follow by Dr. Morrow who started Ropinirole 8 ER mg daily at last visit.            Time:    Noah Hall PA-C

## 2024-08-10 NOTE — ASSESSMENT & PLAN NOTE
Amlodipine 5 mg po dose stopped on 7/22/2024 per patient request. Lisinopril increased at that time but blood pressure still elevated. Will increase lisinopril and consider adding additional agent.

## 2024-08-13 DIAGNOSIS — I10 PRIMARY HYPERTENSION: Primary | ICD-10-CM

## 2024-08-13 DIAGNOSIS — R52 PAIN: ICD-10-CM

## 2024-08-13 RX ORDER — CARVEDILOL 6.25 MG/1
6.25 TABLET ORAL 2 TIMES DAILY
Qty: 60 TABLET | Refills: 11 | Status: SHIPPED | OUTPATIENT
Start: 2024-08-13 | End: 2025-08-13

## 2024-08-13 RX ORDER — TRAMADOL HYDROCHLORIDE 50 MG/1
50 TABLET ORAL NIGHTLY
Qty: 30 TABLET | Refills: 0 | Status: SHIPPED | OUTPATIENT
Start: 2024-08-13 | End: 2024-09-12

## 2024-08-16 ENCOUNTER — TELEPHONE (OUTPATIENT)
Dept: NEUROLOGY | Facility: CLINIC | Age: 69
End: 2024-08-16
Payer: COMMERCIAL

## 2024-08-16 DIAGNOSIS — G62.9 NEUROPATHY: Primary | ICD-10-CM

## 2024-08-16 NOTE — TELEPHONE ENCOUNTER
Patient wife calling in stating patient is having extreme pain (neuropathy). Patient could not get an appointment with  until March 2025, wife is very upset about about how many  months away this appointment is.  Please advise.

## 2024-08-22 RX ORDER — AMITRIPTYLINE HYDROCHLORIDE 10 MG/1
TABLET, FILM COATED ORAL
Qty: 60 TABLET | Refills: 11 | Status: SHIPPED | OUTPATIENT
Start: 2024-08-22

## 2024-09-10 ENCOUNTER — APPOINTMENT (OUTPATIENT)
Dept: VASCULAR SURGERY | Facility: CLINIC | Age: 69
End: 2024-09-10
Payer: COMMERCIAL

## 2024-11-22 ENCOUNTER — TELEPHONE (OUTPATIENT)
Dept: NEUROLOGY | Facility: CLINIC | Age: 69
End: 2024-11-22
Payer: COMMERCIAL

## 2024-11-22 NOTE — TELEPHONE ENCOUNTER
----- Message from Tarsha GRIMES sent at 11/22/2024  1:45 PM EST -----  Regarding: RE: scheduling error  The patient is scheduled as NPV with Dr. Chase.  ----- Message -----  From: Farzaneh Zhao  Sent: 11/12/2024  11:34 AM EST  To: Tarsha Heard; Oma Camops; #  Subject: RE: scheduling error                             Hi Trasha,  See below and correct accordingly. Even though the patient is established within the  neurology department, he would be new to Dr. Chase. Please contact the patient to reschedule as a NPV or schedule home with his current neurologist as an EPV. Replay all when complete.    Thank you  ----- Message -----  From: Oma Campos  Sent: 11/12/2024  10:31 AM EST  To: Tarsha Heard; Elyssa Weller MA; #  Subject: RE: scheduling error                             Hello,  Appointment was scheduled by Referral Management. I have cc'd for assistance.  ----- Message -----  From: Elyssa Weller MA  Sent: 11/11/2024   2:35 PM EST  To: Tarsha Heard; #  Subject: scheduling error                                 Hello;  So this patient it NOT established w/ Dr. Chase- she's never seen this patient before. He's scheduled as an established patient.  He'll need to be rescheduled correctly as a new patient. Pt can refer back to Dr. Morrow.  Thank you.

## 2025-02-10 ENCOUNTER — APPOINTMENT (OUTPATIENT)
Dept: CARDIOLOGY | Facility: HOSPITAL | Age: 70
End: 2025-02-10
Payer: COMMERCIAL

## 2025-02-10 ENCOUNTER — HOSPITAL ENCOUNTER (EMERGENCY)
Facility: HOSPITAL | Age: 70
Discharge: HOME | End: 2025-02-11
Attending: STUDENT IN AN ORGANIZED HEALTH CARE EDUCATION/TRAINING PROGRAM
Payer: COMMERCIAL

## 2025-02-10 ENCOUNTER — APPOINTMENT (OUTPATIENT)
Dept: RADIOLOGY | Facility: HOSPITAL | Age: 70
End: 2025-02-10
Payer: COMMERCIAL

## 2025-02-10 DIAGNOSIS — R00.2 HEART PALPITATIONS: Primary | ICD-10-CM

## 2025-02-10 DIAGNOSIS — Z86.79 HISTORY OF ABNORMAL ELECTROCARDIOGRAM: ICD-10-CM

## 2025-02-10 LAB
ALBUMIN SERPL BCP-MCNC: 4.1 G/DL (ref 3.4–5)
ALP SERPL-CCNC: 76 U/L (ref 33–136)
ALT SERPL W P-5'-P-CCNC: 10 U/L (ref 10–52)
ANION GAP SERPL CALC-SCNC: 17 MMOL/L (ref 10–20)
AST SERPL W P-5'-P-CCNC: 15 U/L (ref 9–39)
BASOPHILS # BLD AUTO: 0.04 X10*3/UL (ref 0–0.1)
BASOPHILS NFR BLD AUTO: 0.6 %
BILIRUB SERPL-MCNC: 0.4 MG/DL (ref 0–1.2)
BNP SERPL-MCNC: 63 PG/ML (ref 0–99)
BUN SERPL-MCNC: 21 MG/DL (ref 6–23)
CALCIUM SERPL-MCNC: 9 MG/DL (ref 8.6–10.3)
CARDIAC TROPONIN I PNL SERPL HS: 3 NG/L (ref 0–20)
CHLORIDE SERPL-SCNC: 104 MMOL/L (ref 98–107)
CO2 SERPL-SCNC: 22 MMOL/L (ref 21–32)
CREAT SERPL-MCNC: 1.03 MG/DL (ref 0.5–1.3)
EGFRCR SERPLBLD CKD-EPI 2021: 80 ML/MIN/1.73M*2
EOSINOPHIL # BLD AUTO: 0.15 X10*3/UL (ref 0–0.7)
EOSINOPHIL NFR BLD AUTO: 2.2 %
ERYTHROCYTE [DISTWIDTH] IN BLOOD BY AUTOMATED COUNT: 12.8 % (ref 11.5–14.5)
GLUCOSE SERPL-MCNC: 102 MG/DL (ref 74–99)
HCT VFR BLD AUTO: 42.5 % (ref 41–52)
HGB BLD-MCNC: 14 G/DL (ref 13.5–17.5)
IMM GRANULOCYTES # BLD AUTO: 0.02 X10*3/UL (ref 0–0.7)
IMM GRANULOCYTES NFR BLD AUTO: 0.3 % (ref 0–0.9)
LYMPHOCYTES # BLD AUTO: 2.35 X10*3/UL (ref 1.2–4.8)
LYMPHOCYTES NFR BLD AUTO: 34.3 %
MAGNESIUM SERPL-MCNC: 2.02 MG/DL (ref 1.6–2.4)
MCH RBC QN AUTO: 30.4 PG (ref 26–34)
MCHC RBC AUTO-ENTMCNC: 32.9 G/DL (ref 32–36)
MCV RBC AUTO: 92 FL (ref 80–100)
MONOCYTES # BLD AUTO: 0.61 X10*3/UL (ref 0.1–1)
MONOCYTES NFR BLD AUTO: 8.9 %
NEUTROPHILS # BLD AUTO: 3.69 X10*3/UL (ref 1.2–7.7)
NEUTROPHILS NFR BLD AUTO: 53.7 %
NRBC BLD-RTO: 0 /100 WBCS (ref 0–0)
PHOSPHATE SERPL-MCNC: 3.1 MG/DL (ref 2.5–4.9)
PLATELET # BLD AUTO: 213 X10*3/UL (ref 150–450)
POTASSIUM SERPL-SCNC: 4.2 MMOL/L (ref 3.5–5.3)
PROT SERPL-MCNC: 7.1 G/DL (ref 6.4–8.2)
RBC # BLD AUTO: 4.6 X10*6/UL (ref 4.5–5.9)
SODIUM SERPL-SCNC: 139 MMOL/L (ref 136–145)
WBC # BLD AUTO: 6.9 X10*3/UL (ref 4.4–11.3)

## 2025-02-10 PROCEDURE — 84100 ASSAY OF PHOSPHORUS: CPT | Performed by: STUDENT IN AN ORGANIZED HEALTH CARE EDUCATION/TRAINING PROGRAM

## 2025-02-10 PROCEDURE — 84443 ASSAY THYROID STIM HORMONE: CPT | Performed by: STUDENT IN AN ORGANIZED HEALTH CARE EDUCATION/TRAINING PROGRAM

## 2025-02-10 PROCEDURE — 80053 COMPREHEN METABOLIC PANEL: CPT

## 2025-02-10 PROCEDURE — 93005 ELECTROCARDIOGRAM TRACING: CPT

## 2025-02-10 PROCEDURE — 85025 COMPLETE CBC W/AUTO DIFF WBC: CPT

## 2025-02-10 PROCEDURE — 83880 ASSAY OF NATRIURETIC PEPTIDE: CPT

## 2025-02-10 PROCEDURE — 36415 COLL VENOUS BLD VENIPUNCTURE: CPT

## 2025-02-10 PROCEDURE — 93970 EXTREMITY STUDY: CPT

## 2025-02-10 PROCEDURE — 83735 ASSAY OF MAGNESIUM: CPT | Performed by: STUDENT IN AN ORGANIZED HEALTH CARE EDUCATION/TRAINING PROGRAM

## 2025-02-10 PROCEDURE — 99285 EMERGENCY DEPT VISIT HI MDM: CPT | Mod: 25 | Performed by: STUDENT IN AN ORGANIZED HEALTH CARE EDUCATION/TRAINING PROGRAM

## 2025-02-10 PROCEDURE — 71275 CT ANGIOGRAPHY CHEST: CPT

## 2025-02-10 PROCEDURE — 84484 ASSAY OF TROPONIN QUANT: CPT

## 2025-02-10 ASSESSMENT — PAIN - FUNCTIONAL ASSESSMENT: PAIN_FUNCTIONAL_ASSESSMENT: 0-10

## 2025-02-10 ASSESSMENT — COLUMBIA-SUICIDE SEVERITY RATING SCALE - C-SSRS
2. HAVE YOU ACTUALLY HAD ANY THOUGHTS OF KILLING YOURSELF?: NO
6. HAVE YOU EVER DONE ANYTHING, STARTED TO DO ANYTHING, OR PREPARED TO DO ANYTHING TO END YOUR LIFE?: NO
1. IN THE PAST MONTH, HAVE YOU WISHED YOU WERE DEAD OR WISHED YOU COULD GO TO SLEEP AND NOT WAKE UP?: NO

## 2025-02-11 VITALS
WEIGHT: 290 LBS | DIASTOLIC BLOOD PRESSURE: 79 MMHG | SYSTOLIC BLOOD PRESSURE: 141 MMHG | TEMPERATURE: 97.3 F | OXYGEN SATURATION: 96 % | RESPIRATION RATE: 17 BRPM | HEART RATE: 74 BPM | HEIGHT: 72 IN | BODY MASS INDEX: 39.28 KG/M2

## 2025-02-11 LAB
CARDIAC TROPONIN I PNL SERPL HS: 3 NG/L (ref 0–20)
TSH SERPL-ACNC: 1.56 MIU/L (ref 0.44–3.98)

## 2025-02-11 PROCEDURE — 84484 ASSAY OF TROPONIN QUANT: CPT

## 2025-02-11 PROCEDURE — 36415 COLL VENOUS BLD VENIPUNCTURE: CPT

## 2025-02-11 PROCEDURE — 2550000001 HC RX 255 CONTRASTS: Performed by: STUDENT IN AN ORGANIZED HEALTH CARE EDUCATION/TRAINING PROGRAM

## 2025-02-11 RX ADMIN — IOHEXOL 75 ML: 350 INJECTION, SOLUTION INTRAVENOUS at 00:32

## 2025-02-11 ASSESSMENT — PAIN SCALES - GENERAL
PAINLEVEL_OUTOF10: 0 - NO PAIN
PAINLEVEL_OUTOF10: 0 - NO PAIN

## 2025-02-11 ASSESSMENT — PAIN - FUNCTIONAL ASSESSMENT: PAIN_FUNCTIONAL_ASSESSMENT: 0-10

## 2025-02-11 NOTE — DISCHARGE INSTRUCTIONS
Please follow-up with cardiology for further evaluation given history of abnormal EKG findings today.  Your lab work done in the emergency department was normal.  Your troponin which is a marker of how hard your heart is working is normal.  Your EKG here did not show any signs of A-fib or a flutter.  You likely had a nonsustained episode of an abnormal heart rhythm and may require a Holter monitor to wear.  Please return to the emergency department have any worsening shortness of breath, chest pain, numbness, tingling, dizziness confusion or any other new or worsening symptoms.    --   Cardiology Clinic  [facility]  Phone: [phone]

## 2025-02-11 NOTE — ED PROVIDER NOTES
History of Present Illness     History provided by: Patient  Limitations to History: None    HPI:  Stephen Varner is a 69 y.o. male presenting from skilled nursing facility with concerns for abnormal EKG. EKG done today by the physician at the [facility], patient and wife at bedside state there is concern for A-fib or a flutter.  Patient has a past medical history of Parkinson's, hypertension, venous insufficiency/lymphedema, peripheral vascular disease.  Patient has had leg cramping that is getting progressively worse over the past few weeks.  He is concerned that his right leg is more swollen than the left.  Additionally patient endorses shortness of breath for the past week. Denies any coughing, fever or chills, upper respiratory symptoms.  He states he has not missed any doses of his Lasix.  Denies any chest pain, abdominal pain.  Last bowel movement was yesterday, patient has history of longstanding constipation.    Physical Exam   Triage vitals:  T 36.3 °C (97.3 °F)  HR 57  /64  RR 20  O2 (!) 93 % None (Room air)    General: Awake, alert, in no acute distress  Eyes: Gaze conjugate.  No scleral icterus or injection  HENT: Normo-cephalic, atraumatic. No stridor  CV: Regular rate, regular rhythm. Radial pulses 2+ bilaterally  Resp: Breathing non-labored, speaking in full sentences.   GI: Soft, mildly distended, non-tender. No rebound or guarding.  MSK/Extremities: Bilateral lower extremity pitting edema.  No gross bony deformities.  Skin: Warm. Appropriate color  Neuro: Alert. Oriented. Face symmetric. Speech is fluent.    Psych: Appropriate mood and affect    Medical Decision Making & ED Course   Medical Decision Makin y.o. male presenting with concern for abnormal EKG done by facility physician. Triage vital signs reviewed and patient is hemodynamically stable at this time.  Initial pulse ox 93%, patient reports 1 month shortness of breath worsening over the past week. Differential includes but is  not limited to PE, ACS, pneumonia, viral illness, anemia, electrolyte abnormalities.  Given patient's history of prior DVT and concern for worsening leg swelling, and tachycardia, CT angio PE and bilateral lower extremity duplex ordered. Bilateral ultrasound showed no signs of DVT, however unable to adequately visualize posterior tibial and peroneal veins.  CT PE had motion artifact, however no evidence of large central pulmonary embolism. Cardiomegaly and coronary artery calcifications noted on scan. Lab work unremarkable with negative troponins.  On reevaluation patient's tachypnea has improved.  Discussed results with patient and family member and all questions answered patient.  Patient needs to follow-up with cardiology, referral placed for cardiology and patient given strict return precautions to the emergency department.  Cardiology appointment was scheduled for the patient on 2/18 prior to him leaving the emergency department.    ED Course:  Diagnoses as of 02/11/25 0251   Heart palpitations   History of abnormal electrocardiogram       EKG Independent Interpretation: The EKG obtained at 0744pm was independently interpreted by myself. It demonstrates sinus bradycardia rhythm with a ventricular rate of 59. normal axis. Intervals , [id number]. ST segments showed no elevation or depression.    Independent Result Review and Interpretation: Relevant laboratory and radiographic results were reviewed and independently interpreted by myself.  As necessary, they are commented on in the ED Course.    Care Considerations: As documented above in MDM      Disposition   As a result of the work-up, the patient was discharged home.  he was informed of his diagnosis and instructed to come back with any concerns or worsening of condition.  he and was agreeable to the plan as discussed above.  he was given the opportunity to ask questions.  All of the patient's questions were answered.    Procedures   Procedures    Patient  seen and discussed with ED attending physician.    Elle Anne DO  Emergency Medicine     Elle Anne DO  Resident  02/11/25 0852

## 2025-02-17 LAB
ATRIAL RATE: 59 BPM
P AXIS: 60 DEGREES
P OFFSET: 188 MS
P ONSET: 131 MS
PR INTERVAL: 188 MS
Q ONSET: 225 MS
QRS COUNT: 10 BEATS
QRS DURATION: 96 MS
QT INTERVAL: 410 MS
QTC CALCULATION(BAZETT): 405 MS
QTC FREDERICIA: 407 MS
R AXIS: -13 DEGREES
T AXIS: 26 DEGREES
T OFFSET: 430 MS
VENTRICULAR RATE: 59 BPM

## 2025-02-18 ENCOUNTER — APPOINTMENT (OUTPATIENT)
Dept: CARDIOLOGY | Facility: CLINIC | Age: 70
End: 2025-02-18
Payer: COMMERCIAL

## 2025-02-26 ENCOUNTER — APPOINTMENT (OUTPATIENT)
Dept: CARDIOLOGY | Facility: CLINIC | Age: 70
End: 2025-02-26
Payer: COMMERCIAL

## 2025-03-05 ENCOUNTER — APPOINTMENT (OUTPATIENT)
Dept: NEUROLOGY | Facility: CLINIC | Age: 70
End: 2025-03-05
Payer: COMMERCIAL

## 2025-04-08 ENCOUNTER — APPOINTMENT (OUTPATIENT)
Dept: NEUROLOGY | Facility: CLINIC | Age: 70
End: 2025-04-08
Payer: COMMERCIAL

## 2025-04-08 VITALS
SYSTOLIC BLOOD PRESSURE: 181 MMHG | WEIGHT: 270 LBS | HEIGHT: 72 IN | DIASTOLIC BLOOD PRESSURE: 89 MMHG | BODY MASS INDEX: 36.57 KG/M2 | TEMPERATURE: 98.4 F | RESPIRATION RATE: 12 BRPM | HEART RATE: 77 BPM

## 2025-04-08 DIAGNOSIS — G25.81 RLS (RESTLESS LEGS SYNDROME): ICD-10-CM

## 2025-04-08 DIAGNOSIS — G20.A1 PARKINSON'S DISEASE WITHOUT DYSKINESIA, UNSPECIFIED WHETHER MANIFESTATIONS FLUCTUATE: Primary | ICD-10-CM

## 2025-04-08 PROCEDURE — 1160F RVW MEDS BY RX/DR IN RCRD: CPT | Performed by: PSYCHIATRY & NEUROLOGY

## 2025-04-08 PROCEDURE — 1159F MED LIST DOCD IN RCRD: CPT | Performed by: PSYCHIATRY & NEUROLOGY

## 2025-04-08 PROCEDURE — 3079F DIAST BP 80-89 MM HG: CPT | Performed by: PSYCHIATRY & NEUROLOGY

## 2025-04-08 PROCEDURE — 99214 OFFICE O/P EST MOD 30 MIN: CPT | Performed by: PSYCHIATRY & NEUROLOGY

## 2025-04-08 PROCEDURE — G2211 COMPLEX E/M VISIT ADD ON: HCPCS | Performed by: PSYCHIATRY & NEUROLOGY

## 2025-04-08 PROCEDURE — 1157F ADVNC CARE PLAN IN RCRD: CPT | Performed by: PSYCHIATRY & NEUROLOGY

## 2025-04-08 PROCEDURE — 3008F BODY MASS INDEX DOCD: CPT | Performed by: PSYCHIATRY & NEUROLOGY

## 2025-04-08 PROCEDURE — 1036F TOBACCO NON-USER: CPT | Performed by: PSYCHIATRY & NEUROLOGY

## 2025-04-08 PROCEDURE — 3077F SYST BP >= 140 MM HG: CPT | Performed by: PSYCHIATRY & NEUROLOGY

## 2025-04-08 PROCEDURE — 1126F AMNT PAIN NOTED NONE PRSNT: CPT | Performed by: PSYCHIATRY & NEUROLOGY

## 2025-04-08 RX ORDER — LOPERAMIDE HCL 2 MG
2 TABLET ORAL 4 TIMES DAILY PRN
COMMUNITY

## 2025-04-08 RX ORDER — ROPINIROLE 8 MG/1
8 TABLET, EXTENDED RELEASE ORAL NIGHTLY
Qty: 30 TABLET | Refills: 11 | Status: SHIPPED | OUTPATIENT
Start: 2025-04-08 | End: 2026-04-08

## 2025-04-08 RX ORDER — TAMSULOSIN HYDROCHLORIDE 0.4 MG/1
0.4 CAPSULE ORAL DAILY
COMMUNITY

## 2025-04-08 RX ORDER — ACETAMINOPHEN 500 MG/1
CAPSULE, LIQUID FILLED ORAL EVERY 6 HOURS PRN
COMMUNITY

## 2025-04-08 RX ORDER — ACETAMINOPHEN, DIPHENHYDRAMINE HCL, PHENYLEPHRINE HCL 325; 25; 5 MG/1; MG/1; MG/1
TABLET ORAL DAILY
COMMUNITY

## 2025-04-08 RX ORDER — FERROUS SULFATE 325(65) MG
325 TABLET, DELAYED RELEASE (ENTERIC COATED) ORAL
COMMUNITY

## 2025-04-08 RX ORDER — DULOXETIN HYDROCHLORIDE 30 MG/1
30 CAPSULE, DELAYED RELEASE ORAL DAILY
COMMUNITY

## 2025-04-08 RX ORDER — FINASTERIDE 5 MG/1
5 TABLET, FILM COATED ORAL DAILY
COMMUNITY

## 2025-04-08 RX ORDER — HYDRALAZINE HYDROCHLORIDE 25 MG/1
TABLET, FILM COATED ORAL
COMMUNITY

## 2025-04-08 RX ORDER — ONDANSETRON 4 MG/1
4 TABLET, FILM COATED ORAL EVERY 8 HOURS PRN
COMMUNITY

## 2025-04-08 ASSESSMENT — ENCOUNTER SYMPTOMS
OCCASIONAL FEELINGS OF UNSTEADINESS: 1
DEPRESSION: 0
LOSS OF SENSATION IN FEET: 1

## 2025-04-08 ASSESSMENT — PATIENT HEALTH QUESTIONNAIRE - PHQ9
9. THOUGHTS THAT YOU WOULD BE BETTER OFF DEAD, OR OF HURTING YOURSELF: NOT AT ALL
2. FEELING DOWN, DEPRESSED OR HOPELESS: MORE THAN HALF THE DAYS
7. TROUBLE CONCENTRATING ON THINGS, SUCH AS READING THE NEWSPAPER OR WATCHING TELEVISION: MORE THAN HALF THE DAYS
3. TROUBLE FALLING OR STAYING ASLEEP OR SLEEPING TOO MUCH: MORE THAN HALF THE DAYS
SUM OF ALL RESPONSES TO PHQ9 QUESTIONS 1 AND 2: 4
10. IF YOU CHECKED OFF ANY PROBLEMS, HOW DIFFICULT HAVE THESE PROBLEMS MADE IT FOR YOU TO DO YOUR WORK, TAKE CARE OF THINGS AT HOME, OR GET ALONG WITH OTHER PEOPLE: VERY DIFFICULT
5. POOR APPETITE OR OVEREATING: MORE THAN HALF THE DAYS
SUM OF ALL RESPONSES TO PHQ QUESTIONS 1-9: 13
8. MOVING OR SPEAKING SO SLOWLY THAT OTHER PEOPLE COULD HAVE NOTICED. OR THE OPPOSITE, BEING SO FIGETY OR RESTLESS THAT YOU HAVE BEEN MOVING AROUND A LOT MORE THAN USUAL: SEVERAL DAYS
4. FEELING TIRED OR HAVING LITTLE ENERGY: SEVERAL DAYS
6. FEELING BAD ABOUT YOURSELF - OR THAT YOU ARE A FAILURE OR HAVE LET YOURSELF OR YOUR FAMILY DOWN: SEVERAL DAYS
1. LITTLE INTEREST OR PLEASURE IN DOING THINGS: MORE THAN HALF THE DAYS

## 2025-04-08 ASSESSMENT — PAIN SCALES - GENERAL: PAINLEVEL_OUTOF10: 0-NO PAIN

## 2025-04-08 NOTE — PROGRESS NOTES
"  Subjective      Stephen Varner is a 69 y.o. year old male is here for follow up for RLS/parkinsonism.  Arrives with wife today who provides history.     Parkinson's Disease    Last visit was July- I advised to stop ropinirole 3mg daily and increase ropinirole 6mg ER to 8mg ER at night.   He was referred to pain mgmt at that time.  In march he was switched from celexa to cymbalta.   Was in the ER in Feb for atrial flutter.   He is taking Ropinirole 3mg in the morning and Ropinirole 6mg XR at night.  I did not prescribe this dosing/medication regimen.  Prescribed by Dr. Montana Hall. He has burning in feet. A lot of freezing.   He is taking Requip 4mg BID   He is still having cramps in feet.   His lymphedema is much worse.    Bloodwork reviewed - 2-10-25-   LFT and renal function tests are WNL.  CBC WNL.   \" He likes the ropinirole\" says the wife. And he says \" it knocks the socks off of me\".   He feels the levodopa has helped a bit with slowness/ rigidity.  He has some spasms of left and right foot.   He lives in a nursing home now, seen by Ankita Martinez.  he has FOG in bathroom.   he feels he has side effects to all medication. he stopped gabapentin, did not like \"how he felt\" on it.   uses a walker to walk, still freezing of gait.   no hallucinations.     he asks about his swelling in his legs, he asks about his urinary issues at night.   he has seen several neurologists. first seen by Dr. Call. he was evaluated for DBS, had levodopa responsive testing and had only 13% improvement with Sinemet on UPDRS.  He has had hx of noncompliance of medications.  he has leg swelling which is chronic.   has freezing of gait in doorways.   his med list states he takes SInemet 25-100mg 2 / 1 / 1 / 2 /2 and Sinemet ER 25-100mg qhs.   RLS is bothering him. he is also on Citalopram.     records from Dr. Seals at [facility], his most recent neurologist- leg cramps at night was biggest complaint - no meds have changed " this.  Review of Systems    Patient Active Problem List   Diagnosis    Antalgic gait    Anxiety    Hypertension    Benign prostatic hyperplasia with urinary obstruction    Chronic deep vein thrombosis (DVT) (Multi)    Chronic pain in right foot    Disease due to severe acute respiratory syndrome coronavirus 2 (SARS-CoV-2)    Dystrophic nail    Onychocryptosis    Edema    Elevated PSA    Heel spur, right    Insomnia    Lower extremity edema    LVH (left ventricular hypertrophy)    Lymphedema    Stasis edema of both lower extremities    Mallet toe of right foot    Memory loss    Obesity, morbid, BMI 40.0-49.9 (Multi)    Observed sleep apnea    Onychomycosis    Parkinson's disease    Restless leg syndrome    Right knee pain    Short of breath on exertion    Sleep difficulties    Snoring    Spasm of muscle, back    Speech abnormality    Unsteady gait    Varicose veins of legs    Venous insufficiency    Venous stasis dermatitis of lower extremity    Recurrent major depressive disorder, in remission (CMS-HCC)    Leg cramps     Past Medical History:   Diagnosis Date    Achilles tendinitis, right leg 11/02/2020    Achilles tendinitis of right lower extremity    Cellulitis of left toe 12/28/2021    Paronychia of great toe, left    Cellulitis of right toe 12/28/2021    Paronychia of toe of right foot    Other hypersomnia 12/28/2017    Excessive daytime sleepiness    Pain in left foot 12/28/2021    Left foot pain    Pain in right foot 07/13/2017    Acute foot pain, right    Parkinson's disease (Multi)     Early-onset Parkinson's disease    Personal history of diseases of the skin and subcutaneous tissue 01/23/2018    History of ingrown nail    Personal history of diseases of the skin and subcutaneous tissue 05/30/2017    History of ingrown nail    Personal history of other diseases of the circulatory system     History of hypertension    Personal history of other diseases of the respiratory system     History of bronchitis      Past Surgical History:   Procedure Laterality Date    OTHER SURGICAL HISTORY  10/12/2016    Cauterization Of Hemorrhoids     Social History     Tobacco Use    Smoking status: Never    Smokeless tobacco: Former     Types: Chew     Quit date: 2008   Substance Use Topics    Alcohol use: Never     family history is not on file.    Current Outpatient Medications:     acetaminophen (TylenoL) 325 mg tablet, Take by mouth., Disp: , Rfl:     acetaminophen (Tylenol) 500 mg capsule, Take by mouth every 6 hours if needed for mild pain (1 - 3)., Disp: , Rfl:     bisacodyl (Dulcolax) 10 mg suppository, Insert into the rectum., Disp: , Rfl:     carbidopa-levodopa (Sinemet CR)  mg ER tablet, Take by mouth., Disp: , Rfl:     carbidopa-levodopa (Sinemet)  mg tablet, Take by mouth., Disp: , Rfl:     carvedilol (Coreg) 6.25 mg tablet, Take 1 tablet (6.25 mg) by mouth 2 times a day., Disp: 60 tablet, Rfl: 11    cholecalciferol (Vitamin D-3) 25 MCG (1000 UT) tablet, Take by mouth., Disp: , Rfl:     docusate sodium (Colace) 100 mg capsule, Take 1 capsule (100 mg) by mouth 2 times a day as needed., Disp: , Rfl:     DULoxetine (Cymbalta) 30 mg DR capsule, Take 1 capsule (30 mg) by mouth once daily. Do not crush or chew., Disp: , Rfl:     ferrous sulfate 325 (65 Fe) mg EC tablet, Take 1 tablet by mouth 3 times daily (morning, midday, late afternoon). Do not crush, chew, or split., Disp: , Rfl:     finasteride (Proscar) 5 mg tablet, Take 1 tablet (5 mg) by mouth once daily. Do not crush, chew, or split., Disp: , Rfl:     furosemide (Lasix) 40 mg tablet, Take 1 tablet (40 mg) by mouth once daily., Disp: , Rfl:     hydrALAZINE (Apresoline) 25 mg tablet, Take by mouth., Disp: , Rfl:     inulin (Fiber Gummies) 2 gram tablet,chewable, Chew., Disp: , Rfl:     lisinopril 5 mg tablet, Take 1 tablet (5 mg) by mouth once daily. (Patient taking differently: Take 4 tablets (20 mg) by mouth once daily. Takes 40mg daily), Disp: , Rfl:      loperamide (Anti-DiarrheaL, loperamide,) 2 mg tablet, Take 1 tablet (2 mg) by mouth 4 times a day as needed for diarrhea., Disp: , Rfl:     LORazepam (Ativan) 1 mg tablet, Take by mouth every 8 hours if needed., Disp: , Rfl:     magnesium hydroxide (Milk of Magnesia) 400 mg/5 mL suspension, Take by mouth., Disp: , Rfl:     magnesium oxide (Mag-Ox) 400 mg (241.3 mg magnesium) tablet, Take by mouth., Disp: , Rfl:     melatonin 10 mg tablet, Take by mouth once daily., Disp: , Rfl:     ondansetron (Zofran) 4 mg tablet, Take 1 tablet (4 mg) by mouth every 8 hours if needed for nausea or vomiting., Disp: , Rfl:     psyllium seed, with sugar, (FIBER ORAL), Take by mouth., Disp: , Rfl:     rOPINIRole (Requip) 4 mg tablet, TAKE 1 TABLET BY MOUTH TWICE DAILY, Disp: 180 tablet, Rfl: 0    tamsulosin (Flomax) 0.4 mg 24 hr capsule, Take 1 capsule (0.4 mg) by mouth once daily., Disp: , Rfl:     triamcinolone (Kenalog) 0.1 % cream, Triamcinolone Acetonide 0.1 % External Cream Quantity: 240  Refills: 0  Start: 21-Dec-2021, Disp: , Rfl:     albuterol 90 mcg/actuation inhaler, Inhale. (Patient not taking: Reported on 4/8/2025), Disp: , Rfl:     amitriptyline (Elavil) 10 mg tablet, Take 1 tab at night x 2 weeks then take 2 tabs at night and continue (Patient not taking: Reported on 4/8/2025), Disp: 60 tablet, Rfl: 11    amLODIPine (Norvasc) 10 mg tablet, Take by mouth. (Patient not taking: Reported on 4/8/2025), Disp: , Rfl:     amLODIPine (Norvasc) 2.5 mg tablet, Take 1 tablet (2.5 mg) by mouth once daily. (Patient not taking: Reported on 4/8/2025), Disp: , Rfl:     amLODIPine (Norvasc) 5 mg tablet, Take 1 tablet (5 mg) by mouth once daily. (Patient not taking: Reported on 4/8/2025), Disp: , Rfl:     amoxicillin (Amoxil) 500 mg capsule, , Disp: , Rfl:     amoxicillin (Amoxil) 500 mg tablet, Take 1 tablet (500 mg) by mouth every 6 hours. (Patient not taking: Reported on 4/8/2025), Disp: , Rfl:     ascorbic acid (Vitamin C) 500 mg  tablet, Take by mouth. (Patient not taking: Reported on 4/8/2025), Disp: , Rfl:     citalopram (CeleXA) 20 mg tablet, Take 1 tablet (20 mg) by mouth once daily. (Patient not taking: Reported on 4/8/2025), Disp: , Rfl:     divalproex (Depakote) 125 mg EC tablet, Take by mouth. (Patient not taking: Reported on 4/8/2025), Disp: , Rfl:     fluticasone (Flonase) 50 mcg/actuation nasal spray, Administer into affected nostril(s). (Patient not taking: Reported on 4/8/2025), Disp: , Rfl:     furosemide (Lasix) 20 mg tablet, , Disp: , Rfl:     gabapentin (Neurontin) 300 mg capsule, Take by mouth. (Patient not taking: Reported on 4/8/2025), Disp: , Rfl:     rivaroxaban (Xarelto) 20 mg tablet, Take 1 tablet (20 mg) by mouth once daily. (Patient not taking: Reported on 4/8/2025), Disp: 90 tablet, Rfl: 0  Allergies   Allergen Reactions    Bee Venom Protein (Honey Bee) Unknown    Other Unknown    Trazodone Confusion and Hallucinations     Bad behavior     There were no vitals taken for this visit.  Neurological Exam/Physical Exam:    Constitutional: General appearance: Pleasant.   Auscultation of Heart: Regular rate and rhythm  Carotid Arteries: no bruits  Peripheral Vascular Exam: severe lymphedema and leg swelling in b/l legs with chronic vascular skin changes.   Mental status: no distress, alert, interactive and cooperative. Affect is appropriate.   Orientation: oriented to person, oriented to place and oriented to time.   Memory: recent memory intact  Slow voice,  lots of pauses.   Fund of knowledge: Patient displays adequate knowledge of current events.  Eyes: The ophthalmoscopic examination was normal.   Cranial nerve II: Visual fields full to confrontation.   Cranial nerves III, IV, and VI: Pupils round, equally reactive to light; EOMs intact. No nystagmus.   Cranial Nerve V: Facial sensation intact to LT bilaterally.   Cranial nerve VII: no facial droop  Cranial nerve VIII: Hearing is intact  Cranial nerves IX and X:  Palate elevates symmetrically.   Cranial nerve XI: Shoulder shrug impaired ( ? Effort)  Cranial nerve XII: Tongue is midline.  Motor:   moderate-severe bradykinesia throughout, worse on L.  He has some hyperextension of big toe on the Left   Deep Tendon Reflexes: left biceps 2+ , right biceps 2+, left triceps 2+, right triceps 2+, left brachioradialis 2+, right brachioradialis 2+, left patella 2+, right patella 2+, left ankle jerk 1+, right ankle jerk 1+   Plantar Reflex: Toes downgoing to plantar stimulation on the left. Toes downgoing to plantar stimulation on the right.   Sensory Exam: Normal to vibratory sensation  Coordination:  no limb dystaxia   Gait:  severe slow and shuffling gait, freezing +.  Needs UE to get out of chair.     Labs:  CBC:   Lab Results   Component Value Date    WBC 6.9 02/10/2025    HGB 14.0 02/10/2025    HCT 42.5 02/10/2025     02/10/2025     BMP:   Lab Results   Component Value Date     02/10/2025    K 4.2 02/10/2025     02/10/2025    CO2 22 02/10/2025    BUN 21 02/10/2025    CREATININE 1.03 02/10/2025    CALCIUM 9.0 02/10/2025    MG 2.02 02/10/2025    PHOS 3.1 02/10/2025     LFT:   Lab Results   Component Value Date    ALKPHOS 76 02/10/2025    BILITOT 0.4 02/10/2025    PROT 7.1 02/10/2025    ALBUMIN 4.1 02/10/2025    ALT 10 02/10/2025    AST 15 02/10/2025         Assessment/Plan   Problem List Items Addressed This Visit    None    Noncompliance. On too much ropinirole 4mg BID. Not sure how he got to this dose ( not prescribed by me)   Leg swelling and neuropathy contributing to walking issues .  Sinemet less responsiveness ? Unclear response.  Will re evaluate this dose next time.  Stop ropinirole 4mg BID and switch to ropinirole 8mg ER at night.   Leg cramps may be worsened with his neuropathy.

## 2025-04-08 NOTE — PATIENT INSTRUCTIONS
"It was a pleasure seeing you today.     STOP ropinirole 4mg twice daily  - start Ropinirole 8mg ER daily 1 tab . -- message or call me in 2-3 weeks : what the restless leg feeling and how tired he is.   ( This should help the restless leg and not make him too tired)    Please make a follow up appointment in 4 months.  You may also schedule a phone or virtual visit sooner on a Friday morning with me as needed before the next clinic appointment.     For any urgent issues or needing to speak to a medical assistant please call [phone], option 6 during our office hours Monday-Friday 8am-4pm, and leave a voicemail with your concern.  My office will try to reach back you as soon as possible within 24 (business) hours.  If you have an emergency please call 911 or visit a local urgent care or nearest emergency room.      Please understand that iDiDiD is a useful communication tool for simple \"normal\" results or a refill request but I would not recommend using this tool for emergent or urgent issues or for conversations with me.  I am happy to ask my staff to rearrange a follow up visit or a virtual visit sooner than requested if appropriate for your care.    "

## 2025-04-10 DIAGNOSIS — G89.29 OTHER CHRONIC PAIN: Primary | ICD-10-CM

## 2025-04-15 ENCOUNTER — OFFICE VISIT (OUTPATIENT)
Dept: PAIN MEDICINE | Facility: CLINIC | Age: 70
End: 2025-04-15
Payer: COMMERCIAL

## 2025-04-15 VITALS
WEIGHT: 270 LBS | SYSTOLIC BLOOD PRESSURE: 122 MMHG | OXYGEN SATURATION: 95 % | TEMPERATURE: 96.4 F | BODY MASS INDEX: 35.78 KG/M2 | RESPIRATION RATE: 18 BRPM | HEART RATE: 73 BPM | DIASTOLIC BLOOD PRESSURE: 68 MMHG | HEIGHT: 73 IN

## 2025-04-15 DIAGNOSIS — G89.29 OTHER CHRONIC PAIN: ICD-10-CM

## 2025-04-15 DIAGNOSIS — M79.2 NEUROPATHIC PAIN: Primary | ICD-10-CM

## 2025-04-15 PROCEDURE — 3008F BODY MASS INDEX DOCD: CPT | Performed by: ANESTHESIOLOGY

## 2025-04-15 PROCEDURE — 1157F ADVNC CARE PLAN IN RCRD: CPT | Performed by: ANESTHESIOLOGY

## 2025-04-15 PROCEDURE — 1159F MED LIST DOCD IN RCRD: CPT | Performed by: ANESTHESIOLOGY

## 2025-04-15 PROCEDURE — 3074F SYST BP LT 130 MM HG: CPT | Performed by: ANESTHESIOLOGY

## 2025-04-15 PROCEDURE — 1036F TOBACCO NON-USER: CPT | Performed by: ANESTHESIOLOGY

## 2025-04-15 PROCEDURE — 1160F RVW MEDS BY RX/DR IN RCRD: CPT | Performed by: ANESTHESIOLOGY

## 2025-04-15 PROCEDURE — 3078F DIAST BP <80 MM HG: CPT | Performed by: ANESTHESIOLOGY

## 2025-04-15 PROCEDURE — 99214 OFFICE O/P EST MOD 30 MIN: CPT | Performed by: ANESTHESIOLOGY

## 2025-04-15 PROCEDURE — 99204 OFFICE O/P NEW MOD 45 MIN: CPT | Performed by: ANESTHESIOLOGY

## 2025-04-15 RX ORDER — PREGABALIN 50 MG/1
CAPSULE ORAL
Qty: 60 CAPSULE | Refills: 0 | Status: SHIPPED | OUTPATIENT
Start: 2025-04-15 | End: 2025-05-20

## 2025-04-15 ASSESSMENT — PATIENT HEALTH QUESTIONNAIRE - PHQ9
SUM OF ALL RESPONSES TO PHQ9 QUESTIONS 1 AND 2: 2
2. FEELING DOWN, DEPRESSED OR HOPELESS: SEVERAL DAYS
1. LITTLE INTEREST OR PLEASURE IN DOING THINGS: SEVERAL DAYS
10. IF YOU CHECKED OFF ANY PROBLEMS, HOW DIFFICULT HAVE THESE PROBLEMS MADE IT FOR YOU TO DO YOUR WORK, TAKE CARE OF THINGS AT HOME, OR GET ALONG WITH OTHER PEOPLE: VERY DIFFICULT

## 2025-04-15 ASSESSMENT — PAIN SCALES - GENERAL
PAINLEVEL_OUTOF10: 10-WORST PAIN EVER
PAINLEVEL_OUTOF10: 10 - WORST POSSIBLE PAIN

## 2025-04-15 ASSESSMENT — ENCOUNTER SYMPTOMS
EYE PAIN: 0
SHORTNESS OF BREATH: 0
FEVER: 0
NUMBNESS: 1
OCCASIONAL FEELINGS OF UNSTEADINESS: 1
DIFFICULTY URINATING: 0
DEPRESSION: 1
ADENOPATHY: 0
NECK PAIN: 1
LOSS OF SENSATION IN FEET: 1
WEAKNESS: 0
CONSTIPATION: 0

## 2025-04-15 ASSESSMENT — COLUMBIA-SUICIDE SEVERITY RATING SCALE - C-SSRS
6. HAVE YOU EVER DONE ANYTHING, STARTED TO DO ANYTHING, OR PREPARED TO DO ANYTHING TO END YOUR LIFE?: NO
1. IN THE PAST MONTH, HAVE YOU WISHED YOU WERE DEAD OR WISHED YOU COULD GO TO SLEEP AND NOT WAKE UP?: NO
2. HAVE YOU ACTUALLY HAD ANY THOUGHTS OF KILLING YOURSELF?: NO

## 2025-04-15 ASSESSMENT — LIFESTYLE VARIABLES: TOTAL SCORE: 0

## 2025-04-15 ASSESSMENT — PAIN DESCRIPTION - DESCRIPTORS: DESCRIPTORS: BURNING;CRAMPING

## 2025-04-15 ASSESSMENT — PAIN - FUNCTIONAL ASSESSMENT: PAIN_FUNCTIONAL_ASSESSMENT: 0-10

## 2025-04-15 NOTE — PROGRESS NOTES
Chief Complain    New Patient Visit (For pain in B/l legs, get cramps every other night. Been getting a sore neck for the uncontrollable movements. Pain goes away when get up and walk around. Denies back and neck surgery. Have images on file with . Is Diagnosed with Parkinson 6 years ago. Was on Ropinole and it has not helped, Was switched to cymbatla which has decreased flare ups in legs every other day. Has been on the wait list for 7 months to see Coco Martínez. )    History Of Present Illness  Stephen Varner is a 69 y.o. male here for evaluation of bilateral feet pain. The patient has been experiencing these symptoms for last 5 year(s). The patient describes the pain as cramping. The patient's current pain score is 10 on a scale from 0-10. The pain is worsened by  worse at night  and is alleviated by  walking . Since the start of the symptoms the pain has been worse.    The patient denies any fever, chills, weight loss, weakness, numbness, bladder/ bowel incontinence, history of cancer, history of IV drug abuse, recent trauma.      Past Medical History  He has a past medical history of Achilles tendinitis, right leg (11/02/2020), Cellulitis of left toe (12/28/2021), Cellulitis of right toe (12/28/2021), Other hypersomnia (12/28/2017), Pain in left foot (12/28/2021), Pain in right foot (07/13/2017), Parkinson's disease, Personal history of diseases of the skin and subcutaneous tissue (01/23/2018), Personal history of diseases of the skin and subcutaneous tissue (05/30/2017), Personal history of other diseases of the circulatory system, and Personal history of other diseases of the respiratory system.    Surgical History  He has a past surgical history that includes Other surgical history (10/12/2016).    Social History  He reports that he has never smoked. He quit smokeless tobacco use about 17 years ago.  His smokeless tobacco use included chew. He reports that he does not drink alcohol and does not use  "drugs.    Family History  No family history on file.     Allergies  Bee venom protein (honey bee), Other, and Trazodone    Review of Systems  Review of Systems   Constitutional:  Negative for fever.   HENT:  Negative for ear pain.    Eyes:  Negative for pain.   Respiratory:  Negative for shortness of breath.    Cardiovascular:  Negative for chest pain.   Gastrointestinal:  Negative for constipation.   Endocrine: Negative for cold intolerance.   Genitourinary:  Negative for difficulty urinating.   Musculoskeletal:  Positive for neck pain.   Skin:  Negative for rash.   Allergic/Immunologic: Negative for food allergies.   Neurological:  Positive for numbness. Negative for weakness.   Hematological:  Negative for adenopathy.   Psychiatric/Behavioral:  Negative for suicidal ideas.         Physical Exam  Physical Exam  HENT:      Head: Normocephalic and atraumatic.   Eyes:      Extraocular Movements: Extraocular movements intact.      Pupils: Pupils are equal, round, and reactive to light.   Cardiovascular:      Rate and Rhythm: Normal rate and regular rhythm.   Pulmonary:      Effort: Pulmonary effort is normal.   Abdominal:      Palpations: Abdomen is soft.   Musculoskeletal:      Cervical back: Neck supple.   Neurological:      Mental Status: He is alert and oriented to person, place, and time.      Deep Tendon Reflexes:      Reflex Scores:       Patellar reflexes are 1+ on the right side and 1+ on the left side.       Achilles reflexes are 0 on the right side and 0 on the left side.     Comments: Bilateral feet dorsiflexion 4/5   Psychiatric:         Mood and Affect: Mood normal.         Behavior: Behavior normal.           Last Recorded Vitals  Blood pressure 122/68, pulse 73, temperature 35.8 °C (96.4 °F), resp. rate 18, height 1.854 m (6' 1\"), weight 122 kg (270 lb), SpO2 95%.        Reviewed Labs   Latest Reference Range & Units 02/10/25 23:05   GLUCOSE 74 - 99 mg/dL 102 (H)   SODIUM 136 - 145 mmol/L 139 "   POTASSIUM 3.5 - 5.3 mmol/L 4.2   CHLORIDE 98 - 107 mmol/L 104   Bicarbonate 21 - 32 mmol/L 22   Anion Gap 10 - 20 mmol/L 17   Blood Urea Nitrogen 6 - 23 mg/dL 21   Creatinine 0.50 - 1.30 mg/dL 1.03   EGFR >60 mL/min/1.73m*2 80   Calcium 8.6 - 10.3 mg/dL 9.0   PHOSPHORUS 2.5 - 4.9 mg/dL 3.1   Albumin 3.4 - 5.0 g/dL 4.1   Alkaline Phosphatase 33 - 136 U/L 76   ALT 10 - 52 U/L 10   AST 9 - 39 U/L 15   Bilirubin Total 0.0 - 1.2 mg/dL 0.4   Total Protein 6.4 - 8.2 g/dL 7.1   (H): Data is abnormally high   Latest Reference Range & Units 02/10/25 23:05   WBC 4.4 - 11.3 x10*3/uL 6.9   nRBC 0.0 - 0.0 /100 WBCs 0.0   RBC 4.50 - 5.90 x10*6/uL 4.60   HEMOGLOBIN 13.5 - 17.5 g/dL 14.0   HEMATOCRIT 41.0 - 52.0 % 42.5   MCV 80 - 100 fL 92   MCH 26.0 - 34.0 pg 30.4   MCHC 32.0 - 36.0 g/dL 32.9   RED CELL DISTRIBUTION WIDTH 11.5 - 14.5 % 12.8   Platelets 150 - 450 x10*3/uL 213   Neutrophils % 40.0 - 80.0 % 53.7   Immature Granulocytes %, Automated 0.0 - 0.9 % 0.3   Lymphocytes % 13.0 - 44.0 % 34.3   Monocytes % 2.0 - 10.0 % 8.9   Eosinophils % 0.0 - 6.0 % 2.2   Basophils % 0.0 - 2.0 % 0.6   Neutrophils Absolute 1.20 - 7.70 x10*3/uL 3.69   Immature Granulocytes Absolute, Automated 0.00 - 0.70 x10*3/uL 0.02   Lymphocytes Absolute 1.20 - 4.80 x10*3/uL 2.35   Monocytes Absolute 0.10 - 1.00 x10*3/uL 0.61   Eosinophils Absolute 0.00 - 0.70 x10*3/uL 0.15   Basophils Absolute 0.00 - 0.10 x10*3/uL 0.04        Assessment/Plan   Encounter Diagnoses   Name Primary?    Other chronic pain     Neuropathic pain Yes        Stephen Varner is a 69 y.o. male here for evaluation of chronic bilateral feet pain.  He has been experiencing the symptoms last 5 years or so.  He has been diagnosed with Parkinson's disease, currently being treated by Dr. Morrow from neurology.  He is currently on ropinirole which was previously helpful now has been less effective.  More recently has been started on Cymbalta he has seen some improvement in his symptoms since  he has been on Cymbalta.  Previously he had issues with gabapentin and could not tolerate tramadol.  On physical examination does have some weakness of his feet dorsiflexion and lymphedema.  I would get an MRI of his lumbar spine for further evaluation and rule out any nerve root impingement.  I would trial him on low-dose of Lyrica, risk benefits and alternatives were discussed.  Controlled substance agreement was signed by the patient.  Would encourage him to continue with the evaluation set up with Dr. Martínez.  I have personally reviewed the OARRS report.  I have considered the risks of abuse, dependence, addiction and diversion.         Hany Sarabia MD

## 2025-04-29 ENCOUNTER — TELEMEDICINE (OUTPATIENT)
Dept: PAIN MEDICINE | Facility: CLINIC | Age: 70
End: 2025-04-29
Payer: COMMERCIAL

## 2025-04-29 DIAGNOSIS — M79.2 NEUROPATHIC PAIN: Primary | ICD-10-CM

## 2025-04-29 PROCEDURE — 1125F AMNT PAIN NOTED PAIN PRSNT: CPT | Performed by: ANESTHESIOLOGY

## 2025-04-29 PROCEDURE — 99213 OFFICE O/P EST LOW 20 MIN: CPT | Performed by: ANESTHESIOLOGY

## 2025-04-29 PROCEDURE — 1157F ADVNC CARE PLAN IN RCRD: CPT | Performed by: ANESTHESIOLOGY

## 2025-04-29 ASSESSMENT — ENCOUNTER SYMPTOMS
OCCASIONAL FEELINGS OF UNSTEADINESS: 1
NUMBNESS: 1
SHORTNESS OF BREATH: 0
DEPRESSION: 1
LOSS OF SENSATION IN FEET: 1

## 2025-04-29 ASSESSMENT — COLUMBIA-SUICIDE SEVERITY RATING SCALE - C-SSRS
2. HAVE YOU ACTUALLY HAD ANY THOUGHTS OF KILLING YOURSELF?: NO
1. IN THE PAST MONTH, HAVE YOU WISHED YOU WERE DEAD OR WISHED YOU COULD GO TO SLEEP AND NOT WAKE UP?: NO
6. HAVE YOU EVER DONE ANYTHING, STARTED TO DO ANYTHING, OR PREPARED TO DO ANYTHING TO END YOUR LIFE?: NO

## 2025-04-29 ASSESSMENT — PATIENT HEALTH QUESTIONNAIRE - PHQ9
1. LITTLE INTEREST OR PLEASURE IN DOING THINGS: NOT AT ALL
SUM OF ALL RESPONSES TO PHQ9 QUESTIONS 1 AND 2: 0
2. FEELING DOWN, DEPRESSED OR HOPELESS: NOT AT ALL

## 2025-04-29 ASSESSMENT — PAIN DESCRIPTION - DESCRIPTORS: DESCRIPTORS: ACHING;BURNING;CRAMPING

## 2025-04-29 ASSESSMENT — PAIN - FUNCTIONAL ASSESSMENT: PAIN_FUNCTIONAL_ASSESSMENT: 0-10

## 2025-04-29 ASSESSMENT — PAIN SCALES - GENERAL: PAINLEVEL_OUTOF10: 8

## 2025-04-29 NOTE — PROGRESS NOTES
Virtual or Telephone Consent    Virtual or Telephone Consent    While technically available, the patient was unable or unwilling to consent to connect via audio/video telehealth technology; therefore, I performed this visit using a real-time audio only connection between Stephen Varner & Hany Sarabia MD.  Verbal consent was requested and obtained from Stephen Varner on this date, 04/29/25 for a telehealth visit and the patient's location was confirmed at the time of the visit.      Chief Complain    Follow-up (For pain in b/l feet, and they cramp up. Started the pregablin 9 days ago and dos not feel any relief )    History Of Present Illness  Stephen Varner is a 69 y.o. male here for follow-up of bilateral feet pain. The patient has been experiencing these symptoms for last 5 year(s). The patient describes the pain as cramping. The patient's current pain score is 0 on a scale from 0-10. The pain is worsened by  worse at night  and is alleviated by  walking .  The last visit he reports some improvement in his symptoms.          Past Medical History  He has a past medical history of Achilles tendinitis, right leg (11/02/2020), Cellulitis of left toe (12/28/2021), Cellulitis of right toe (12/28/2021), Other hypersomnia (12/28/2017), Pain in left foot (12/28/2021), Pain in right foot (07/13/2017), Parkinson's disease, Personal history of diseases of the skin and subcutaneous tissue (01/23/2018), Personal history of diseases of the skin and subcutaneous tissue (05/30/2017), Personal history of other diseases of the circulatory system, and Personal history of other diseases of the respiratory system.    Surgical History  He has a past surgical history that includes Other surgical history (10/12/2016).    Social History  He reports that he has never smoked. He quit smokeless tobacco use about 17 years ago.  His smokeless tobacco use included chew. He reports that he does not drink alcohol and does not use  drugs.    Family History  No family history on file.     Allergies  Bee venom protein (honey bee), Other, and Trazodone    Review of Systems  Review of Systems   Respiratory:  Negative for shortness of breath.    Cardiovascular:  Negative for chest pain.   Neurological:  Positive for numbness.   Psychiatric/Behavioral:  Negative for suicidal ideas.         Physical Exam  Physical Exam  Neurological:      Mental Status: He is oriented to person, place, and time.           Last Recorded Vitals  There were no vitals taken for this visit.           Assessment/Plan   Encounter Diagnosis   Name Primary?    Neuropathic pain Yes          Stephen Varner is a 69 y.o. male here for follow-up of chronic bilateral feet pain.  He has been experiencing the symptoms last 5 years or so.  He has been diagnosed with Parkinson's disease, currently being treated by Dr. Morrow from neurology.  He is currently on ropinirole which was previously helpful now has been less effective.  More recently has been started on Cymbalta he has seen some improvement in his symptoms since he has been on Cymbalta.  Previously he had issues with gabapentin and could not tolerate tramadol.  Last visit we started on low-dose of Lyrica currently taking 50 mg twice daily.  He does report some improvement in his pain however he says is too early to tell.  He however denies any significant side effects.  Discussed potentially increasing the nighttime dose to 100 mg to get better coverage during the night as his worst symptoms are at night.  Where he would prefer to stay at the current dose for another few weeks to see the response.  Continue with 50 mg twice daily of Lyrica for another month, follow-up in 4 to 6 weeks.  I have personally reviewed the OARRS report.  I have considered the risks of abuse, dependence, addiction and diversion.       Hany Sarabia MD

## 2025-04-30 ENCOUNTER — APPOINTMENT (OUTPATIENT)
Dept: NEUROLOGY | Facility: CLINIC | Age: 70
End: 2025-04-30
Payer: COMMERCIAL

## 2025-04-30 VITALS
SYSTOLIC BLOOD PRESSURE: 131 MMHG | HEIGHT: 72 IN | DIASTOLIC BLOOD PRESSURE: 84 MMHG | HEART RATE: 67 BPM | BODY MASS INDEX: 36.62 KG/M2

## 2025-04-30 DIAGNOSIS — G62.9 NEUROPATHY: ICD-10-CM

## 2025-04-30 DIAGNOSIS — R25.2 CRAMPS OF LOWER EXTREMITY: Primary | ICD-10-CM

## 2025-04-30 PROBLEM — G60.9 IDIOPATHIC PERIPHERAL NEUROPATHY: Status: ACTIVE | Noted: 2025-03-18

## 2025-04-30 PROBLEM — I25.2 OLD MI (MYOCARDIAL INFARCTION): Status: ACTIVE | Noted: 2025-02-11

## 2025-04-30 PROBLEM — N17.9 ACUTE KIDNEY INJURY: Status: ACTIVE | Noted: 2021-05-05

## 2025-04-30 PROBLEM — R94.31 ABNORMAL EKG: Status: ACTIVE | Noted: 2025-02-11

## 2025-04-30 PROBLEM — E44.1 MILD PROTEIN-CALORIE MALNUTRITION (WEIGHT FOR AGE 75-89% OF STANDARD) (MULTI): Status: ACTIVE | Noted: 2021-05-05

## 2025-04-30 PROBLEM — Z86.79 HISTORY OF HYPERTENSION: Status: ACTIVE | Noted: 2025-04-30

## 2025-04-30 PROBLEM — E55.9 VITAMIN D DEFICIENCY: Status: ACTIVE | Noted: 2024-09-08

## 2025-04-30 PROBLEM — F23 BRIEF PSYCHOTIC DISORDER (MULTI): Status: ACTIVE | Noted: 2021-05-05

## 2025-04-30 PROBLEM — I45.10 INCOMPLETE RBBB: Status: ACTIVE | Noted: 2025-02-11

## 2025-04-30 PROBLEM — R26.2 DIFFICULTY WALKING: Status: ACTIVE | Noted: 2021-05-05

## 2025-04-30 PROBLEM — R63.5 WEIGHT GAIN: Status: ACTIVE | Noted: 2025-01-10

## 2025-04-30 PROCEDURE — 3079F DIAST BP 80-89 MM HG: CPT | Performed by: PSYCHIATRY & NEUROLOGY

## 2025-04-30 PROCEDURE — 3075F SYST BP GE 130 - 139MM HG: CPT | Performed by: PSYCHIATRY & NEUROLOGY

## 2025-04-30 PROCEDURE — 99215 OFFICE O/P EST HI 40 MIN: CPT | Performed by: PSYCHIATRY & NEUROLOGY

## 2025-04-30 PROCEDURE — 1157F ADVNC CARE PLAN IN RCRD: CPT | Performed by: PSYCHIATRY & NEUROLOGY

## 2025-04-30 PROCEDURE — 1036F TOBACCO NON-USER: CPT | Performed by: PSYCHIATRY & NEUROLOGY

## 2025-04-30 PROCEDURE — 1125F AMNT PAIN NOTED PAIN PRSNT: CPT | Performed by: PSYCHIATRY & NEUROLOGY

## 2025-04-30 RX ORDER — ALBUTEROL SULFATE 90 UG/1
2 INHALANT RESPIRATORY (INHALATION) EVERY 6 HOURS PRN
COMMUNITY

## 2025-04-30 ASSESSMENT — ENCOUNTER SYMPTOMS
WEAKNESS: 1
TREMORS: 1
NUMBNESS: 1
DYSPHORIC MOOD: 1
NERVOUS/ANXIOUS: 1

## 2025-04-30 ASSESSMENT — PAIN SCALES - GENERAL: PAINLEVEL_OUTOF10: 2

## 2025-04-30 NOTE — PATIENT INSTRUCTIONS
Will check some blood work for further evaluation of causes of neuropathy. If this is negative recommend to increase exercise to 30 minutes daily and stretch your legs multiple times a day. Also important to avoid prolonged sitting more than 45 minutes a day.      Try to increase water intake.     Continue to work with pain management for medication management.    Follow up as needed.

## 2025-04-30 NOTE — PROGRESS NOTES
Subjective     Stephen Varner is a right handed  69 y.o. year old male who presents with neuropathy (NPV, rmd- Dr. Morrow-- legs/Wife-Emilee). Patient is accompanied by: Other POA.    Visit type: new patient visit    HPI     He has cramping in his feet.  This is a like a charley horse cramp.  This has been going on since covid.  This has been going on for 5 years.  He does have lymphedema in his legs.  He saw a vascular specialist and he is wearing sleeves.      There is numbness and tingling in the toes.      The pain got worse after the ropinirole dose was adjusted.      Those symptoms are mostly at night.  He can be up all night.  When he gets the charley horse cramps, then walking relieves it.      He walks to and from therapy.  He was having a parkinson's PT program that resolved.  He sits with his feet elevated in a lift chair to keep his legs elevated.  He sleeps in that chair.  He is in the chair up to 80% of the day.  He tries to walk to his meals.      He is a resident at [facility] and the doctor there put him on Cymbalta.      Dr. Morrow recommend that he see pain management who added Lyrica. The combination has helped.      Review of Systems   Eyes:  Positive for visual disturbance.   Musculoskeletal:  Positive for gait problem.   Neurological:  Positive for tremors, weakness and numbness.   Psychiatric/Behavioral:  Positive for dysphoric mood. The patient is nervous/anxious.    All other systems reviewed and are negative.    All other systems have been reviewed and are negative for complaint.     Medical History[1]  Family History[2]  Surgical History[3]   Social History     Tobacco Use    Smoking status: Never    Smokeless tobacco: Former     Types: Chew     Quit date: 2008   Substance Use Topics    Alcohol use: Not Currently          Objective     Neurological Exam    Physical Exam    On general examination, the patient is well appearing and well groomed. Heart is regular, rate and rhythm. Lungs  are clear to ausculation bilaterally. There is no peripheral edema. Normal pedal pulses bilaterally. No carotid bruits.   On neurologic examination, the mental status is unremarkable to informal testing. The history is related in quite good detail with no obvious deficit of attention, memory or language. Fund of knowledge is adequate. Orientation is intact to person, place and time. Spontaneous speech is hypophonic. On cranial nerve exam, the pupils are equal, round and reactive to light. Extraocular movements are full, without nystagmus.  Visual fields are full to confrontation. The fundi are benign with normal sharp disc margins. There is no bulbofacial weakness or ptosis. The tongue is midline with no wasting or fasciculations. The palate elevates symmetrically. Facial sensation is intact to light touch and pinprick bilaterally. Hearing is intact to finger rub bilaterally. Shoulder shrug is 5/5 bilaterally.  Neck flexion and extension have full strength. Motor examination reveals rigidity in the legs bilaterally.  There is full strength in the proximal and distal muscles of the upper and lower extremities bilaterally. Deep tendon reflexes are 2/4 and symmetric throughout the upper and lower extremities bilaterally, including the ankle jerks. Plantar responses are flexor bilaterally. There is no tremor. On coordination testing, finger-nose-finger testing are done well bilaterally. Sensory examination reveals normal light touch, pinprick and vibratory  sensation in the distal upper and lower extremities bilaterally. Gait is slow and shuffling.        Chemistry    Lab Results   Component Value Date/Time     02/10/2025 2305    K 4.2 02/10/2025 2305     02/10/2025 2305    CO2 22 02/10/2025 2305    BUN 21 02/10/2025 2305    CREATININE 1.03 02/10/2025 2305    Lab Results   Component Value Date/Time    CALCIUM 9.0 02/10/2025 2305    ALKPHOS 76 02/10/2025 2305    AST 15 02/10/2025 2305    ALT 10 02/10/2025  2305    BILITOT 0.4 02/10/2025 2305           Assessment/Plan   Mr. Varner is a 69 year old man presenting for initial evaluation of cramps and paresthesias in the legs. Symptoms are most severe at night.  Patient has significant edema in the lower extremities due to lymphedema.  This is the most likely cause of his symptoms. He may have some superimposed neuropathy but his sensory exam was mostly intake and and EMG of the legs is likely useless with this degree of edema.  Discussed conservative management of cramps with fluids, exercise, stretching.  He has gotten benefit from medications started by pain management so will defer to them. He should try to increase water intake.  Unfortunately due to his HTN he can not increase salt intake.     Will check blood work for secondary causes of neuropathy. Otherwise he can continue to work with pain management.    He would benefit from improved control of his edema in the legs although should try to avoid further intravascular depletion as increasing diuretics is likely to worsen cramps.     Coco Chase DO                 [1]   Past Medical History:  Diagnosis Date    Achilles tendinitis, right leg 11/02/2020    Achilles tendinitis of right lower extremity    Cellulitis of left toe 12/28/2021    Paronychia of great toe, left    Cellulitis of right toe 12/28/2021    Paronychia of toe of right foot    Other hypersomnia 12/28/2017    Excessive daytime sleepiness    Pain in left foot 12/28/2021    Left foot pain    Pain in right foot 07/13/2017    Acute foot pain, right    Parkinson's disease     Early-onset Parkinson's disease    Personal history of diseases of the skin and subcutaneous tissue 01/23/2018    History of ingrown nail    Personal history of diseases of the skin and subcutaneous tissue 05/30/2017    History of ingrown nail    Personal history of other diseases of the circulatory system     History of hypertension    Personal history of other diseases of the  respiratory system     History of bronchitis   [2] No family history on file.  [3]   Past Surgical History:  Procedure Laterality Date    OTHER SURGICAL HISTORY  10/12/2016    Cauterization Of Hemorrhoids

## 2025-05-28 ENCOUNTER — TELEPHONE (OUTPATIENT)
Dept: NEUROLOGY | Facility: CLINIC | Age: 70
End: 2025-05-28
Payer: MEDICARE

## 2025-05-28 DIAGNOSIS — M79.2 NEUROPATHIC PAIN: ICD-10-CM

## 2025-05-28 RX ORDER — PREGABALIN 50 MG/1
50 CAPSULE ORAL 2 TIMES DAILY
Qty: 60 CAPSULE | Refills: 2 | Status: SHIPPED | OUTPATIENT
Start: 2025-05-28 | End: 2025-08-26

## 2025-05-28 NOTE — TELEPHONE ENCOUNTER
----- Message from Coco Chase sent at 5/23/2025 11:28 AM EDT -----  Please let the patient know that his blood work shows a borderline low B12. I recommend that he start 1000 mcg daily.  Low B12 can cause neuropathy.  ----- Message -----  From: Elyssa Weller MA  Sent: 5/6/2025  12:10 PM EDT  To: Coco Chase, DO

## 2025-06-11 DIAGNOSIS — Z12.12 SCREENING FOR COLORECTAL CANCER: ICD-10-CM

## 2025-06-11 DIAGNOSIS — Z12.11 SCREENING FOR COLORECTAL CANCER: ICD-10-CM

## 2025-07-02 NOTE — ASSESSMENT & PLAN NOTE
Ambulatory using U Step walker. Uses gym here at  [facility]. Has routine of daily walk outside on property grounds.    Resume iron supplement. Recheck levels before next visit

## 2025-08-19 ENCOUNTER — APPOINTMENT (OUTPATIENT)
Dept: NEUROLOGY | Facility: CLINIC | Age: 70
End: 2025-08-19
Payer: COMMERCIAL

## 2025-09-26 ENCOUNTER — APPOINTMENT (OUTPATIENT)
Dept: NEUROLOGY | Facility: CLINIC | Age: 70
End: 2025-09-26
Payer: MEDICARE

## 2025-09-30 ENCOUNTER — APPOINTMENT (OUTPATIENT)
Dept: NEUROLOGY | Facility: CLINIC | Age: 70
End: 2025-09-30
Payer: MEDICARE